# Patient Record
Sex: MALE | Race: WHITE | NOT HISPANIC OR LATINO | URBAN - METROPOLITAN AREA
[De-identification: names, ages, dates, MRNs, and addresses within clinical notes are randomized per-mention and may not be internally consistent; named-entity substitution may affect disease eponyms.]

---

## 2017-06-12 ENCOUNTER — OUTPATIENT (OUTPATIENT)
Dept: OUTPATIENT SERVICES | Facility: HOSPITAL | Age: 62
LOS: 1 days | Discharge: HOME | End: 2017-06-12

## 2017-06-28 DIAGNOSIS — N50.9 DISORDER OF MALE GENITAL ORGANS, UNSPECIFIED: ICD-10-CM

## 2019-08-06 ENCOUNTER — LABORATORY RESULT (OUTPATIENT)
Age: 64
End: 2019-08-06

## 2019-08-06 ENCOUNTER — APPOINTMENT (OUTPATIENT)
Dept: HEMATOLOGY ONCOLOGY | Facility: CLINIC | Age: 64
End: 2019-08-06
Payer: COMMERCIAL

## 2019-08-06 ENCOUNTER — FORM ENCOUNTER (OUTPATIENT)
Age: 64
End: 2019-08-06

## 2019-08-06 VITALS
BODY MASS INDEX: 22.73 KG/M2 | TEMPERATURE: 97 F | HEART RATE: 80 BPM | HEIGHT: 68 IN | DIASTOLIC BLOOD PRESSURE: 70 MMHG | WEIGHT: 150 LBS | SYSTOLIC BLOOD PRESSURE: 145 MMHG | RESPIRATION RATE: 14 BRPM

## 2019-08-06 DIAGNOSIS — Z78.9 OTHER SPECIFIED HEALTH STATUS: ICD-10-CM

## 2019-08-06 DIAGNOSIS — D72.829 ELEVATED WHITE BLOOD CELL COUNT, UNSPECIFIED: ICD-10-CM

## 2019-08-06 LAB
HCT VFR BLD CALC: 38.1 %
HGB BLD-MCNC: 12.7 G/DL
MCHC RBC-ENTMCNC: 30.4 PG
MCHC RBC-ENTMCNC: 33.3 G/DL
MCV RBC AUTO: 91.1 FL
PLATELET # BLD AUTO: 293 K/UL
PMV BLD: 9.8 FL
RBC # BLD: 4.18 M/UL
RBC # FLD: 16.4 %
WBC # FLD AUTO: 157.62 K/UL

## 2019-08-06 PROCEDURE — 99244 OFF/OP CNSLTJ NEW/EST MOD 40: CPT

## 2019-08-07 ENCOUNTER — OUTPATIENT (OUTPATIENT)
Dept: OUTPATIENT SERVICES | Facility: HOSPITAL | Age: 64
LOS: 1 days | Discharge: HOME | End: 2019-08-07
Payer: COMMERCIAL

## 2019-08-07 DIAGNOSIS — D72.829 ELEVATED WHITE BLOOD CELL COUNT, UNSPECIFIED: ICD-10-CM

## 2019-08-07 PROCEDURE — 76700 US EXAM ABDOM COMPLETE: CPT | Mod: 26

## 2019-08-08 NOTE — ASSESSMENT
[FreeTextEntry1] : 64 with no PMH was referred to us by his PMD for leukocytosis . CBC done in office showed WBC count of 157.62 , Hgb of 12.7 , platelet count 293 , with differential showing 60% neutrophils , lymphocytes of 4.6% and basophils 2.8% . \par \par # LEUKOCYTOSIS WITH white blood cell differential SHOWING  virtually all cells of the neutrophilic series SUGGESTIVE OF MYELOPROLIFERATIVE DISORDER LIKELY  CHRONIC PHASE CML :\par - peripheral smear reviewed , no blasts ,  basophilia and granulocytosis with neutrophils and immature granulocytes.\par \par - will check peripheral blood flow cytometry , fish , cytogenetics, BCR - ABL fusion gene .\par - will recommend getting us abd to evaluate for splenomegaly\par - he will need a bone marrow biopsy even if CML is confirmed  , he was told that we will call him with all the test results in few days , if it proves to CML will start treatment with gleevec since we suspect chronic phase CML most likely low risk and will calculate risk once we have flow back,  explained will consider second generation TKI if he is high risk,  side effect profile was briefly discussed with him, mainly  gleevac,  and he agreed to treatment  . Will hold off on to hydrea as his counts are acceptable and he has no symptoms or blasts in smear.\par - He was also told that if he wants to go [facility] for second opinion , he can get all the test results and go for second opinion. His daughter works there.\par - But if  he wishes to stay with us we will work according to above mentioned plan.\par - f/u 2-3 weeks once we start  treatment, Will call him with results and  will speak about side effects in detail when I see him for the bone marrow.\par -if BCR/ABL negative will send off Jak2, CALR, MPL from bone marrow specimen to r/o Myeloproliferative disorder \par -explained that if he has CML prognosis is excellent,  in most patient , normal life expectancy   \par \par Thank You

## 2019-08-08 NOTE — HISTORY OF PRESENT ILLNESS
[de-identified] : CC: My WBC is high\par \par He is here at the request of Dr. Oumou Washington MD  \par \par A 64 with no PMH was referred to us by his PMD for leukocytosis . Pt reports that he was in his usual state of health up until a 3 wks ago when he started having back pain with hemtauria , that prompted him to go to his PMD , by that time he was asymptomatic .He was referred for some routine w/u , CBC done on 7/31  showed Hb of 12.4 , with WBC count of 141.9 , diff of 70% neutrophils with rare blast cells, lymphocytes of 4% , platelets were 267 , rest of the blood work was fine . Pt was then sent for repeat CBC on 8/2, which again showed WBC count of 135 with Hb of 12 and normal platelet count , differential again demonstrated 45% neutrophils , 8% lymphocytes and 1 blast . Pt was then referred to us for further evaluation . Pt reports that he has no new symptoms, is very active , denies weight loss, loss of appetite , any recent infections, or any other symptoms .

## 2019-08-08 NOTE — CONSULT LETTER
[Dear  ___] : Dear  [unfilled], [Consult Letter:] : I had the pleasure of evaluating your patient, [unfilled]. [Please see my note below.] : Please see my note below. [Consult Closing:] : Thank you very much for allowing me to participate in the care of this patient.  If you have any questions, please do not hesitate to contact me. [Sincerely,] : Sincerely, [FreeTextEntry3] : Carlos

## 2019-08-08 NOTE — PHYSICAL EXAM
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

## 2019-08-09 LAB — T(9;22)(ABL1,BCR)/CONTROL BLD/T: NORMAL

## 2019-08-13 ENCOUNTER — LABORATORY RESULT (OUTPATIENT)
Age: 64
End: 2019-08-13

## 2019-08-13 ENCOUNTER — APPOINTMENT (OUTPATIENT)
Dept: HEMATOLOGY ONCOLOGY | Facility: CLINIC | Age: 64
End: 2019-08-13
Payer: COMMERCIAL

## 2019-08-13 ENCOUNTER — RESULT REVIEW (OUTPATIENT)
Age: 64
End: 2019-08-13

## 2019-08-13 VITALS — HEART RATE: 78 BPM | RESPIRATION RATE: 16 BRPM | TEMPERATURE: 98.4 F

## 2019-08-13 VITALS
BODY MASS INDEX: 22.43 KG/M2 | HEIGHT: 68 IN | WEIGHT: 148 LBS | SYSTOLIC BLOOD PRESSURE: 140 MMHG | DIASTOLIC BLOOD PRESSURE: 74 MMHG

## 2019-08-13 PROCEDURE — 88313 SPECIAL STAINS GROUP 2: CPT | Mod: 26

## 2019-08-13 PROCEDURE — 85097 BONE MARROW INTERPRETATION: CPT

## 2019-08-13 PROCEDURE — 88341 IMHCHEM/IMCYTCHM EA ADD ANTB: CPT | Mod: 26

## 2019-08-13 PROCEDURE — 88305 TISSUE EXAM BY PATHOLOGIST: CPT | Mod: 26

## 2019-08-13 PROCEDURE — 88311 DECALCIFY TISSUE: CPT | Mod: 26

## 2019-08-13 PROCEDURE — 38222 DX BONE MARROW BX & ASPIR: CPT

## 2019-08-13 PROCEDURE — 88342 IMHCHEM/IMCYTCHM 1ST ANTB: CPT | Mod: 26,59

## 2019-08-19 ENCOUNTER — LABORATORY RESULT (OUTPATIENT)
Age: 64
End: 2019-08-19

## 2019-08-19 ENCOUNTER — APPOINTMENT (OUTPATIENT)
Dept: HEMATOLOGY ONCOLOGY | Facility: CLINIC | Age: 64
End: 2019-08-19
Payer: COMMERCIAL

## 2019-08-19 VITALS
HEART RATE: 65 BPM | TEMPERATURE: 98.3 F | HEIGHT: 68 IN | RESPIRATION RATE: 16 BRPM | BODY MASS INDEX: 22.58 KG/M2 | WEIGHT: 149 LBS | SYSTOLIC BLOOD PRESSURE: 132 MMHG | DIASTOLIC BLOOD PRESSURE: 71 MMHG

## 2019-08-19 LAB
HCT VFR BLD CALC: 37.5 %
HGB BLD-MCNC: 12.3 G/DL
MCHC RBC-ENTMCNC: 30.5 PG
MCHC RBC-ENTMCNC: 32.8 G/DL
MCV RBC AUTO: 93.1 FL
PLATELET # BLD AUTO: 213 K/UL
PMV BLD: 10.3 FL
RBC # BLD: 4.03 M/UL
RBC # FLD: 16.6 %
WBC # FLD AUTO: 98.31 K/UL

## 2019-08-19 PROCEDURE — 99213 OFFICE O/P EST LOW 20 MIN: CPT

## 2019-08-19 PROCEDURE — 93010 ELECTROCARDIOGRAM REPORT: CPT

## 2019-08-19 NOTE — REASON FOR VISIT
[Follow-Up Visit] : a follow-up visit for [Initial Consultation] : an initial consultation [Spouse] : spouse [FreeTextEntry2] : Leukocytosis

## 2019-08-19 NOTE — ASSESSMENT
[FreeTextEntry1] : 64 with no PMH was referred to us by his PMD for leukocytosis  Found to have  CML with p210 and small p190 clone\par \par Plan:\par -on Sprycel 100 mg daiy and hydrea 500 mg BID for now, WBC coming down \par -will follow up  bone marrow results\par -RTC in 2 weeks with CBC\par -will check [facility] for QTC\par

## 2019-08-19 NOTE — PHYSICAL EXAM
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

## 2019-08-19 NOTE — HISTORY OF PRESENT ILLNESS
[FreeTextEntry1] :  sprycel 100 mg daily started on 8/14/19 [de-identified] : 8/19/19\par he is here for follow up. His BCR ABL was positive for p210 and a small clone of p190 and he was stared on Sprycel 100 mg daily. he was aslo stared on hydrea  500 mg BID. HE is doing well and his WBC are coming down. he had a bone marrow results are pending. [facility] and they agreed with current treatment.  [de-identified] : CC: My WBC is high\par \par He is here at the request of Dr. Oumou Washington MD  \par \par A 64 with no PMH was referred to us by his PMD for leukocytosis . Pt reports that he was in his usual state of health up until a 3 wks ago when he started having back pain with hemtauria , that prompted him to go to his PMD , by that time he was asymptomatic .He was referred for some routine w/u , CBC done on 7/31  showed Hb of 12.4 , with WBC count of 141.9 , diff of 70% neutrophils with rare blast cells, lymphocytes of 4% , platelets were 267 , rest of the blood work was fine . Pt was then sent for repeat CBC on 8/2, which again showed WBC count of 135 with Hb of 12 and normal platelet count , differential again demonstrated 45% neutrophils , 8% lymphocytes and 1 blast . Pt was then referred to us for further evaluation . Pt reports that he has no new symptoms, is very active , denies weight loss, loss of appetite , any recent infections, or any other symptoms .

## 2019-08-20 LAB — HEMATOPATHOLOGY REPORT: SIGNIFICANT CHANGE UP

## 2019-09-04 ENCOUNTER — LABORATORY RESULT (OUTPATIENT)
Age: 64
End: 2019-09-04

## 2019-09-04 ENCOUNTER — APPOINTMENT (OUTPATIENT)
Dept: HEMATOLOGY ONCOLOGY | Facility: CLINIC | Age: 64
End: 2019-09-04
Payer: COMMERCIAL

## 2019-09-04 VITALS
BODY MASS INDEX: 22.73 KG/M2 | DIASTOLIC BLOOD PRESSURE: 69 MMHG | HEART RATE: 72 BPM | SYSTOLIC BLOOD PRESSURE: 134 MMHG | WEIGHT: 150 LBS | HEIGHT: 68 IN | RESPIRATION RATE: 16 BRPM | TEMPERATURE: 97.1 F

## 2019-09-04 DIAGNOSIS — Z00.00 ENCOUNTER FOR GENERAL ADULT MEDICAL EXAMINATION W/OUT ABNORMAL FINDINGS: ICD-10-CM

## 2019-09-04 LAB
ALBUMIN SERPL ELPH-MCNC: 4.4 G/DL
ALP BLD-CCNC: 75 U/L
ALT SERPL-CCNC: 25 U/L
ANION GAP SERPL CALC-SCNC: 11 MMOL/L
AST SERPL-CCNC: 17 U/L
BILIRUB SERPL-MCNC: 0.5 MG/DL
BUN SERPL-MCNC: 16 MG/DL
CALCIUM SERPL-MCNC: 8.5 MG/DL
CHLORIDE SERPL-SCNC: 107 MMOL/L
CO2 SERPL-SCNC: 24 MMOL/L
CREAT SERPL-MCNC: 0.6 MG/DL
GLUCOSE SERPL-MCNC: 104 MG/DL
HCT VFR BLD CALC: 30 %
HGB BLD-MCNC: 10 G/DL
MCHC RBC-ENTMCNC: 31.1 PG
MCHC RBC-ENTMCNC: 33.3 G/DL
MCV RBC AUTO: 93.2 FL
PLATELET # BLD AUTO: 76 K/UL
PMV BLD: 9.6 FL
POTASSIUM SERPL-SCNC: 4.1 MMOL/L
PROT SERPL-MCNC: 6.7 G/DL
RBC # BLD: 3.22 M/UL
RBC # FLD: 18.8 %
SODIUM SERPL-SCNC: 142 MMOL/L
WBC # FLD AUTO: 4.98 K/UL

## 2019-09-04 PROCEDURE — 99213 OFFICE O/P EST LOW 20 MIN: CPT

## 2019-09-04 NOTE — PHYSICAL EXAM
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

## 2019-09-04 NOTE — ASSESSMENT
[FreeTextEntry1] : 64 with no PMH was referred to us by his PMD for leukocytosis  Found to have chronic phase   CML with p210 and small p190 clone\par \par Plan:\par - c/w Sprycel 100 mg daily\par -will stop hydrea now since normal WBC, his Hgb went down to to 10 and platelets are now 76,000\par -CMP sent will call with results if needed \par -will check CBC in 2 weeks and RTC in one months\par -  BCR-ABL at 3 month intervals for about 1 year and then it may be extended, not due yet\par \par

## 2019-09-04 NOTE — HISTORY OF PRESENT ILLNESS
[Therapy: ___] : Therapy: [unfilled] [de-identified] : 8/19/19\par he is here for follow up. His BCR ABL was positive for p210 and a small clone of p190 and he was stared on Sprycel 100 mg daily. he was aslo stared on hydrea  500 mg BID. HE is doing well and his WBC are coming down. he had a bone marrow results are pending. he was seen in [facility] and they agreed with current treatment. \par \par 9/4/19\par He is here for follow-up of CML.  He has been tolerating Sprycel daily with Hydrea 500mg BiD.  He offers no new complaints.  His appetite has been improving.    His CBC showed a normal WBC, his Hgb went down to to 10 and platelets are now 76,000. His bone marrow biopsy confimred chronic phase CML with repeat BCR/ABL PCR showed p210 and P190 [FreeTextEntry1] : Sprycel 100 mg daily started on 8/14/19 [de-identified] : CC: My WBC is high\par \par He is here at the request of Dr. Oumou Washington MD  \par \par A 64 with no PMH was referred to us by his PMD for leukocytosis . Pt reports that he was in his usual state of health up until a 3 wks ago when he started having back pain with hemtauria , that prompted him to go to his PMD , by that time he was asymptomatic .He was referred for some routine w/u , CBC done on 7/31  showed Hb of 12.4 , with WBC count of 141.9 , diff of 70% neutrophils with rare blast cells, lymphocytes of 4% , platelets were 267 , rest of the blood work was fine . Pt was then sent for repeat CBC on 8/2, which again showed WBC count of 135 with Hb of 12 and normal platelet count , differential again demonstrated 45% neutrophils , 8% lymphocytes and 1 blast . Pt was then referred to us for further evaluation . Pt reports that he has no new symptoms, is very active , denies weight loss, loss of appetite , any recent infections, or any other symptoms .

## 2019-09-13 ENCOUNTER — RX RENEWAL (OUTPATIENT)
Age: 64
End: 2019-09-13

## 2019-09-17 ENCOUNTER — APPOINTMENT (OUTPATIENT)
Dept: HEMATOLOGY ONCOLOGY | Facility: CLINIC | Age: 64
End: 2019-09-17

## 2019-09-17 ENCOUNTER — LABORATORY RESULT (OUTPATIENT)
Age: 64
End: 2019-09-17

## 2019-09-17 LAB
HCT VFR BLD CALC: 32.4 %
HGB BLD-MCNC: 10.7 G/DL
MCHC RBC-ENTMCNC: 32.3 PG
MCHC RBC-ENTMCNC: 33 G/DL
MCV RBC AUTO: 97.9 FL
PLATELET # BLD AUTO: 148 K/UL
PMV BLD: 9.8 FL
RBC # BLD: 3.31 M/UL
RBC # FLD: 19.9 %
WBC # FLD AUTO: 4.98 K/UL

## 2019-10-21 ENCOUNTER — APPOINTMENT (OUTPATIENT)
Dept: HEMATOLOGY ONCOLOGY | Facility: CLINIC | Age: 64
End: 2019-10-21
Payer: COMMERCIAL

## 2019-10-21 ENCOUNTER — LABORATORY RESULT (OUTPATIENT)
Age: 64
End: 2019-10-21

## 2019-10-21 VITALS
BODY MASS INDEX: 23.49 KG/M2 | TEMPERATURE: 97.6 F | HEIGHT: 68 IN | SYSTOLIC BLOOD PRESSURE: 150 MMHG | DIASTOLIC BLOOD PRESSURE: 91 MMHG | RESPIRATION RATE: 16 BRPM | WEIGHT: 155 LBS | HEART RATE: 67 BPM

## 2019-10-21 PROCEDURE — 99213 OFFICE O/P EST LOW 20 MIN: CPT

## 2019-10-21 RX ORDER — HYDROXYUREA 500 MG/1
500 CAPSULE ORAL
Qty: 60 | Refills: 0 | Status: COMPLETED | COMMUNITY
Start: 2019-08-13 | End: 2019-10-21

## 2019-10-22 LAB
ALBUMIN SERPL ELPH-MCNC: 4.4 G/DL
ALP BLD-CCNC: 96 U/L
ALT SERPL-CCNC: 16 U/L
ANION GAP SERPL CALC-SCNC: 14 MMOL/L
AST SERPL-CCNC: 15 U/L
BILIRUB SERPL-MCNC: 0.5 MG/DL
BUN SERPL-MCNC: 19 MG/DL
CALCIUM SERPL-MCNC: 9.3 MG/DL
CHLORIDE SERPL-SCNC: 104 MMOL/L
CO2 SERPL-SCNC: 23 MMOL/L
CREAT SERPL-MCNC: 0.7 MG/DL
GLUCOSE SERPL-MCNC: 114 MG/DL
HCT VFR BLD CALC: 39.8 %
HGB BLD-MCNC: 13.2 G/DL
MCHC RBC-ENTMCNC: 32 PG
MCHC RBC-ENTMCNC: 33.2 G/DL
MCV RBC AUTO: 96.6 FL
PLATELET # BLD AUTO: 153 K/UL
PMV BLD: 9.9 FL
POTASSIUM SERPL-SCNC: 4 MMOL/L
PROT SERPL-MCNC: 7.1 G/DL
RBC # BLD: 4.12 M/UL
RBC # FLD: 14.4 %
SODIUM SERPL-SCNC: 141 MMOL/L
WBC # FLD AUTO: 4.51 K/UL

## 2019-10-24 NOTE — HISTORY OF PRESENT ILLNESS
[Therapy: ___] : Therapy: [unfilled] [de-identified] : 8/19/19\par he is here for follow up. His BCR ABL was positive for p210 and a small clone of p190 and he was stared on Sprycel 100 mg daily. he was aslo stared on hydrea  500 mg BID. HE is doing well and his WBC are coming down. he had a bone marrow results are pending. he was seen in [facility] and they agreed with current treatment. \par \par 9/4/19\par He is here for follow-up of CML.  He has been tolerating Sprycel daily with Hydrea 500mg BiD.  He offers no new complaints.  His appetite has been improving.    His CBC showed a normal WBC, his Hgb went down to to 10 and platelets are now 76,000. His bone marrow biopsy confimred chronic phase CML with repeat BCR/ABL PCR showed p210 and P190\par \par 10/21/19\par He is here for follow up. Reports no Side effects. His CBC is showing mild anemia and mild decrease WBC. He has no complaints. Not SOB.  No edema.  [FreeTextEntry1] : Sprycel 100 mg daily started on 8/14/19 [de-identified] : CC: My WBC is high\par \par He is here at the request of Dr. Oumou Washington MD  \par \par A 64 with no PMH was referred to us by his PMD for leukocytosis . Pt reports that he was in his usual state of health up until a 3 wks ago when he started having back pain with hemtauria , that prompted him to go to his PMD , by that time he was asymptomatic .He was referred for some routine w/u , CBC done on 7/31  showed Hb of 12.4 , with WBC count of 141.9 , diff of 70% neutrophils with rare blast cells, lymphocytes of 4% , platelets were 267 , rest of the blood work was fine . Pt was then sent for repeat CBC on 8/2, which again showed WBC count of 135 with Hb of 12 and normal platelet count , differential again demonstrated 45% neutrophils , 8% lymphocytes and 1 blast . Pt was then referred to us for further evaluation . Pt reports that he has no new symptoms, is very active , denies weight loss, loss of appetite , any recent infections, or any other symptoms .

## 2019-10-24 NOTE — ASSESSMENT
[FreeTextEntry1] : 64 with no PMH was referred to us by his PMD for leukocytosis  Found to have chronic phase   CML with p210 and small p190 clone\par \par Plan:\par - c/w Sprycel 100 mg daily\par -will check BCR/ABL and CBC next month\par -  BCR-ABL at 3 month intervals for about 1 year and then it may be extended,\par -will see me in 3 months of BCR/ABL decreasing as expected and has no side effects\par \par \par \par

## 2019-11-14 ENCOUNTER — LABORATORY RESULT (OUTPATIENT)
Age: 64
End: 2019-11-14

## 2019-11-14 ENCOUNTER — APPOINTMENT (OUTPATIENT)
Dept: HEMATOLOGY ONCOLOGY | Facility: CLINIC | Age: 64
End: 2019-11-14

## 2019-11-14 ENCOUNTER — OUTPATIENT (OUTPATIENT)
Dept: OUTPATIENT SERVICES | Facility: HOSPITAL | Age: 64
LOS: 1 days | Discharge: HOME | End: 2019-11-14
Payer: COMMERCIAL

## 2019-11-14 DIAGNOSIS — D72.829 ELEVATED WHITE BLOOD CELL COUNT, UNSPECIFIED: ICD-10-CM

## 2019-11-14 DIAGNOSIS — C92.10 CHRONIC MYELOID LEUKEMIA, BCR/ABL-POSITIVE, NOT HAVING ACHIEVED REMISSION: ICD-10-CM

## 2019-11-14 LAB
HCT VFR BLD CALC: 42.1 %
HGB BLD-MCNC: 14.2 G/DL
MCHC RBC-ENTMCNC: 31.7 PG
MCHC RBC-ENTMCNC: 33.7 G/DL
MCV RBC AUTO: 94 FL
PLATELET # BLD AUTO: 179 K/UL
PMV BLD: 9.4 FL
RBC # BLD: 4.48 M/UL
RBC # FLD: 13 %
WBC # FLD AUTO: 6.98 K/UL

## 2019-11-16 LAB
ALBUMIN SERPL ELPH-MCNC: 4.7 G/DL
ALP BLD-CCNC: 94 U/L
ALT SERPL-CCNC: 16 U/L
ANION GAP SERPL CALC-SCNC: 14 MMOL/L
AST SERPL-CCNC: 19 U/L
BILIRUB SERPL-MCNC: 0.3 MG/DL
BUN SERPL-MCNC: 20 MG/DL
CALCIUM SERPL-MCNC: 9 MG/DL
CHLORIDE SERPL-SCNC: 103 MMOL/L
CO2 SERPL-SCNC: 25 MMOL/L
CREAT SERPL-MCNC: 0.8 MG/DL
GLUCOSE SERPL-MCNC: 86 MG/DL
POTASSIUM SERPL-SCNC: 3.9 MMOL/L
PROT SERPL-MCNC: 7.7 G/DL
SODIUM SERPL-SCNC: 142 MMOL/L

## 2019-11-21 LAB — T(9;22)(ABL1,BCR)/CONTROL BLD/T: NORMAL

## 2019-12-24 ENCOUNTER — OUTPATIENT (OUTPATIENT)
Dept: OUTPATIENT SERVICES | Facility: HOSPITAL | Age: 64
LOS: 1 days | Discharge: HOME | End: 2019-12-24
Payer: COMMERCIAL

## 2019-12-24 VITALS
DIASTOLIC BLOOD PRESSURE: 74 MMHG | WEIGHT: 149.03 LBS | HEART RATE: 68 BPM | OXYGEN SATURATION: 99 % | SYSTOLIC BLOOD PRESSURE: 132 MMHG | TEMPERATURE: 98 F | HEIGHT: 68 IN | RESPIRATION RATE: 16 BRPM

## 2019-12-24 DIAGNOSIS — Z98.890 OTHER SPECIFIED POSTPROCEDURAL STATES: Chronic | ICD-10-CM

## 2019-12-24 DIAGNOSIS — N20.0 CALCULUS OF KIDNEY: ICD-10-CM

## 2019-12-24 DIAGNOSIS — Z01.818 ENCOUNTER FOR OTHER PREPROCEDURAL EXAMINATION: ICD-10-CM

## 2019-12-24 LAB
ALBUMIN SERPL ELPH-MCNC: 4.2 G/DL — SIGNIFICANT CHANGE UP (ref 3.5–5.2)
ALP SERPL-CCNC: 87 U/L — SIGNIFICANT CHANGE UP (ref 30–115)
ALT FLD-CCNC: 13 U/L — SIGNIFICANT CHANGE UP (ref 0–41)
ANION GAP SERPL CALC-SCNC: 15 MMOL/L — HIGH (ref 7–14)
APPEARANCE UR: CLEAR — SIGNIFICANT CHANGE UP
APTT BLD: 33.5 SEC — SIGNIFICANT CHANGE UP (ref 27–39.2)
AST SERPL-CCNC: 16 U/L — SIGNIFICANT CHANGE UP (ref 0–41)
BACTERIA # UR AUTO: NEGATIVE — SIGNIFICANT CHANGE UP
BASOPHILS # BLD AUTO: 0.04 K/UL — SIGNIFICANT CHANGE UP (ref 0–0.2)
BASOPHILS NFR BLD AUTO: 0.6 % — SIGNIFICANT CHANGE UP (ref 0–1)
BILIRUB SERPL-MCNC: 0.5 MG/DL — SIGNIFICANT CHANGE UP (ref 0.2–1.2)
BILIRUB UR-MCNC: NEGATIVE — SIGNIFICANT CHANGE UP
BUN SERPL-MCNC: 20 MG/DL — SIGNIFICANT CHANGE UP (ref 10–20)
CALCIUM SERPL-MCNC: 9 MG/DL — SIGNIFICANT CHANGE UP (ref 8.5–10.1)
CHLORIDE SERPL-SCNC: 100 MMOL/L — SIGNIFICANT CHANGE UP (ref 98–110)
CO2 SERPL-SCNC: 25 MMOL/L — SIGNIFICANT CHANGE UP (ref 17–32)
COLOR SPEC: YELLOW — SIGNIFICANT CHANGE UP
CREAT SERPL-MCNC: 0.9 MG/DL — SIGNIFICANT CHANGE UP (ref 0.7–1.5)
DIFF PNL FLD: NEGATIVE — SIGNIFICANT CHANGE UP
EOSINOPHIL # BLD AUTO: 0.12 K/UL — SIGNIFICANT CHANGE UP (ref 0–0.7)
EOSINOPHIL NFR BLD AUTO: 1.7 % — SIGNIFICANT CHANGE UP (ref 0–8)
EPI CELLS # UR: 1 /HPF — SIGNIFICANT CHANGE UP (ref 0–5)
GLUCOSE SERPL-MCNC: 103 MG/DL — HIGH (ref 70–99)
GLUCOSE UR QL: NEGATIVE — SIGNIFICANT CHANGE UP
HCT VFR BLD CALC: 41.8 % — LOW (ref 42–52)
HGB BLD-MCNC: 14 G/DL — SIGNIFICANT CHANGE UP (ref 14–18)
HYALINE CASTS # UR AUTO: 2 /LPF — SIGNIFICANT CHANGE UP (ref 0–7)
IMM GRANULOCYTES NFR BLD AUTO: 0.1 % — SIGNIFICANT CHANGE UP (ref 0.1–0.3)
INR BLD: 1.07 RATIO — SIGNIFICANT CHANGE UP (ref 0.65–1.3)
KETONES UR-MCNC: NEGATIVE — SIGNIFICANT CHANGE UP
LEUKOCYTE ESTERASE UR-ACNC: ABNORMAL
LYMPHOCYTES # BLD AUTO: 3.28 K/UL — SIGNIFICANT CHANGE UP (ref 1.2–3.4)
LYMPHOCYTES # BLD AUTO: 46.6 % — SIGNIFICANT CHANGE UP (ref 20.5–51.1)
MCHC RBC-ENTMCNC: 30.9 PG — SIGNIFICANT CHANGE UP (ref 27–31)
MCHC RBC-ENTMCNC: 33.5 G/DL — SIGNIFICANT CHANGE UP (ref 32–37)
MCV RBC AUTO: 92.3 FL — SIGNIFICANT CHANGE UP (ref 80–94)
MONOCYTES # BLD AUTO: 0.68 K/UL — HIGH (ref 0.1–0.6)
MONOCYTES NFR BLD AUTO: 9.7 % — HIGH (ref 1.7–9.3)
NEUTROPHILS # BLD AUTO: 2.91 K/UL — SIGNIFICANT CHANGE UP (ref 1.4–6.5)
NEUTROPHILS NFR BLD AUTO: 41.3 % — LOW (ref 42.2–75.2)
NITRITE UR-MCNC: POSITIVE
NRBC # BLD: 0 /100 WBCS — SIGNIFICANT CHANGE UP (ref 0–0)
PH UR: 6 — SIGNIFICANT CHANGE UP (ref 5–8)
PLATELET # BLD AUTO: 175 K/UL — SIGNIFICANT CHANGE UP (ref 130–400)
POTASSIUM SERPL-MCNC: 3.8 MMOL/L — SIGNIFICANT CHANGE UP (ref 3.5–5)
POTASSIUM SERPL-SCNC: 3.8 MMOL/L — SIGNIFICANT CHANGE UP (ref 3.5–5)
PROT SERPL-MCNC: 7.5 G/DL — SIGNIFICANT CHANGE UP (ref 6–8)
PROT UR-MCNC: SIGNIFICANT CHANGE UP
PROTHROM AB SERPL-ACNC: 12.3 SEC — SIGNIFICANT CHANGE UP (ref 9.95–12.87)
RBC # BLD: 4.53 M/UL — LOW (ref 4.7–6.1)
RBC # FLD: 13.4 % — SIGNIFICANT CHANGE UP (ref 11.5–14.5)
RBC CASTS # UR COMP ASSIST: 1 /HPF — SIGNIFICANT CHANGE UP (ref 0–4)
SODIUM SERPL-SCNC: 140 MMOL/L — SIGNIFICANT CHANGE UP (ref 135–146)
SP GR SPEC: 1.03 — HIGH (ref 1.01–1.02)
UROBILINOGEN FLD QL: SIGNIFICANT CHANGE UP
WBC # BLD: 7.04 K/UL — SIGNIFICANT CHANGE UP (ref 4.8–10.8)
WBC # FLD AUTO: 7.04 K/UL — SIGNIFICANT CHANGE UP (ref 4.8–10.8)
WBC UR QL: 22 /HPF — HIGH (ref 0–5)

## 2019-12-24 PROCEDURE — 71046 X-RAY EXAM CHEST 2 VIEWS: CPT | Mod: 26

## 2019-12-24 PROCEDURE — 93010 ELECTROCARDIOGRAM REPORT: CPT

## 2019-12-24 NOTE — H&P PST ADULT - NSANTHOSAYNRD_GEN_A_CORE
No. EGE screening performed.  STOP BANG Legend: 0-2 = LOW Risk; 3-4 = INTERMEDIATE Risk; 5-8 = HIGH Risk

## 2019-12-24 NOTE — H&P PST ADULT - REASON FOR ADMISSION
64 yr old man to past for cystoscopy laser lithotripsy of bladder stones no fever no dysuria no uri cp palp sob pain at this time exercise tolerance is 2 lfights no rahul screen revd

## 2019-12-26 LAB
-  AMIKACIN: SIGNIFICANT CHANGE UP
-  AZTREONAM: SIGNIFICANT CHANGE UP
-  CEFEPIME: SIGNIFICANT CHANGE UP
-  CEFTAZIDIME: SIGNIFICANT CHANGE UP
-  CIPROFLOXACIN: SIGNIFICANT CHANGE UP
-  GENTAMICIN: SIGNIFICANT CHANGE UP
-  IMIPENEM: SIGNIFICANT CHANGE UP
-  LEVOFLOXACIN: SIGNIFICANT CHANGE UP
-  MEROPENEM: SIGNIFICANT CHANGE UP
-  PIPERACILLIN/TAZOBACTAM: SIGNIFICANT CHANGE UP
-  TOBRAMYCIN: SIGNIFICANT CHANGE UP
CULTURE RESULTS: SIGNIFICANT CHANGE UP
METHOD TYPE: SIGNIFICANT CHANGE UP
ORGANISM # SPEC MICROSCOPIC CNT: SIGNIFICANT CHANGE UP
ORGANISM # SPEC MICROSCOPIC CNT: SIGNIFICANT CHANGE UP
SPECIMEN SOURCE: SIGNIFICANT CHANGE UP

## 2020-01-08 ENCOUNTER — OUTPATIENT (OUTPATIENT)
Dept: OUTPATIENT SERVICES | Facility: HOSPITAL | Age: 65
LOS: 1 days | Discharge: HOME | End: 2020-01-08

## 2020-01-08 VITALS
SYSTOLIC BLOOD PRESSURE: 156 MMHG | DIASTOLIC BLOOD PRESSURE: 78 MMHG | RESPIRATION RATE: 17 BRPM | TEMPERATURE: 98 F | HEART RATE: 85 BPM | WEIGHT: 149.03 LBS | OXYGEN SATURATION: 100 % | HEIGHT: 68 IN

## 2020-01-08 VITALS — DIASTOLIC BLOOD PRESSURE: 70 MMHG | HEART RATE: 67 BPM | SYSTOLIC BLOOD PRESSURE: 156 MMHG | RESPIRATION RATE: 18 BRPM

## 2020-01-08 DIAGNOSIS — Z98.890 OTHER SPECIFIED POSTPROCEDURAL STATES: Chronic | ICD-10-CM

## 2020-01-08 RX ORDER — PHENAZOPYRIDINE HCL 100 MG
200 TABLET ORAL ONCE
Refills: 0 | Status: COMPLETED | OUTPATIENT
Start: 2020-01-08 | End: 2020-01-08

## 2020-01-08 RX ORDER — ACETAMINOPHEN 500 MG
650 TABLET ORAL ONCE
Refills: 0 | Status: DISCONTINUED | OUTPATIENT
Start: 2020-01-08 | End: 2020-02-04

## 2020-01-08 RX ORDER — ONDANSETRON 8 MG/1
4 TABLET, FILM COATED ORAL ONCE
Refills: 0 | Status: DISCONTINUED | OUTPATIENT
Start: 2020-01-08 | End: 2020-02-04

## 2020-01-08 RX ORDER — OXYCODONE AND ACETAMINOPHEN 5; 325 MG/1; MG/1
1 TABLET ORAL EVERY 4 HOURS
Refills: 0 | Status: DISCONTINUED | OUTPATIENT
Start: 2020-01-08 | End: 2020-01-08

## 2020-01-08 RX ORDER — MORPHINE SULFATE 50 MG/1
2 CAPSULE, EXTENDED RELEASE ORAL
Refills: 0 | Status: DISCONTINUED | OUTPATIENT
Start: 2020-01-08 | End: 2020-01-08

## 2020-01-08 RX ORDER — DASATINIB 80 MG/1
1 TABLET ORAL
Qty: 0 | Refills: 0 | DISCHARGE

## 2020-01-08 RX ORDER — SODIUM CHLORIDE 9 MG/ML
1000 INJECTION, SOLUTION INTRAVENOUS
Refills: 0 | Status: DISCONTINUED | OUTPATIENT
Start: 2020-01-08 | End: 2020-02-04

## 2020-01-08 RX ADMIN — SODIUM CHLORIDE 100 MILLILITER(S): 9 INJECTION, SOLUTION INTRAVENOUS at 11:45

## 2020-01-08 RX ADMIN — Medication 200 MILLIGRAM(S): at 11:44

## 2020-01-08 NOTE — ASU DISCHARGE PLAN (ADULT/PEDIATRIC) - CARE PROVIDER_API CALL
Rubin Torres)  Urology  [facility] [facility], [address]  [place] [address]  Phone: [phone]  Fax: [phone]  Follow Up Time:

## 2020-01-08 NOTE — BRIEF OPERATIVE NOTE - NSICDXBRIEFPOSTOP_GEN_ALL_CORE_FT
POST-OP DIAGNOSIS:  BPH with obstruction/lower urinary tract symptoms 08-Jan-2020 10:48:07  Rubin Torres  Bladder stones 08-Jan-2020 10:47:56  Rubin Torres

## 2020-01-08 NOTE — BRIEF OPERATIVE NOTE - NSICDXBRIEFPREOP_GEN_ALL_CORE_FT
PRE-OP DIAGNOSIS:  BPH with urinary obstruction 08-Jan-2020 10:47:37  Rubin Torres  Bladder stones 08-Jan-2020 10:47:28  Rubni Torrse

## 2020-01-08 NOTE — ASU DISCHARGE PLAN (ADULT/PEDIATRIC) - CALL YOUR DOCTOR IF YOU HAVE ANY OF THE FOLLOWING:
Swelling that gets worse/Pain not relieved by Medications/Nausea and vomiting that does not stop/Bleeding that does not stop/Fever greater than (need to indicate Fahrenheit or Celsius)

## 2020-01-10 DIAGNOSIS — N21.0 CALCULUS IN BLADDER: ICD-10-CM

## 2020-01-10 DIAGNOSIS — N40.1 BENIGN PROSTATIC HYPERPLASIA WITH LOWER URINARY TRACT SYMPTOMS: ICD-10-CM

## 2020-01-10 DIAGNOSIS — N13.8 OTHER OBSTRUCTIVE AND REFLUX UROPATHY: ICD-10-CM

## 2020-01-10 LAB — NIDUS STONE QN: SIGNIFICANT CHANGE UP

## 2020-01-21 NOTE — PRE-ANESTHESIA EVALUATION ADULT - RESPIRATORY RATE (BREATHS/MIN)
17 Bactrim Counseling:  I discussed with the patient the risks of sulfa antibiotics including but not limited to GI upset, allergic reaction, drug rash, diarrhea, dizziness, photosensitivity, and yeast infections.  Rarely, more serious reactions can occur including but not limited to aplastic anemia, agranulocytosis, methemoglobinemia, blood dyscrasias, liver or kidney failure, lung infiltrates or desquamative/blistering drug rashes.

## 2020-01-22 ENCOUNTER — OUTPATIENT (OUTPATIENT)
Dept: OUTPATIENT SERVICES | Facility: HOSPITAL | Age: 65
LOS: 1 days | Discharge: HOME | End: 2020-01-22

## 2020-01-22 ENCOUNTER — APPOINTMENT (OUTPATIENT)
Dept: HEMATOLOGY ONCOLOGY | Facility: CLINIC | Age: 65
End: 2020-01-22
Payer: COMMERCIAL

## 2020-01-22 ENCOUNTER — LABORATORY RESULT (OUTPATIENT)
Age: 65
End: 2020-01-22

## 2020-01-22 VITALS
HEIGHT: 68 IN | RESPIRATION RATE: 16 BRPM | HEART RATE: 77 BPM | WEIGHT: 155 LBS | BODY MASS INDEX: 23.49 KG/M2 | DIASTOLIC BLOOD PRESSURE: 68 MMHG | TEMPERATURE: 97.5 F | SYSTOLIC BLOOD PRESSURE: 139 MMHG

## 2020-01-22 DIAGNOSIS — Z98.890 OTHER SPECIFIED POSTPROCEDURAL STATES: Chronic | ICD-10-CM

## 2020-01-22 PROBLEM — C92.10 CHRONIC MYELOID LEUKEMIA, BCR/ABL-POSITIVE, NOT HAVING ACHIEVED REMISSION: Chronic | Status: ACTIVE | Noted: 2019-12-24

## 2020-01-22 LAB
HCT VFR BLD CALC: 39.8 %
HGB BLD-MCNC: 13.2 G/DL
MCHC RBC-ENTMCNC: 29.8 PG
MCHC RBC-ENTMCNC: 33.2 G/DL
MCV RBC AUTO: 89.8 FL
PLATELET # BLD AUTO: 186 K/UL
PMV BLD: 9.3 FL
RBC # BLD: 4.43 M/UL
RBC # FLD: 14.3 %
WBC # FLD AUTO: 8.27 K/UL

## 2020-01-22 PROCEDURE — 99213 OFFICE O/P EST LOW 20 MIN: CPT

## 2020-01-22 NOTE — ASSESSMENT
[FreeTextEntry1] : 64 with no PMH was referred to us by his PMD for leukocytosis  Found to have chronic phase   CML with p210 and small p190 clone. p190 clone has resolved and BCR ABL is falling at the 3 months. \par \par Plan:\par - c/w Sprycel 100 mg daily\par -will check BCR/ABL  today with CMP as well\par -  BCR-ABL at 3 month intervals for about 1 year and then it may be extended,\par -will see me in 3 months\par \par \par

## 2020-01-22 NOTE — PHYSICAL EXAM
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

## 2020-01-22 NOTE — HISTORY OF PRESENT ILLNESS
[Therapy: ___] : Therapy: [unfilled] [FreeTextEntry1] : Sprycel 100 mg daily started on 8/14/19 [de-identified] : 8/19/19\par he is here for follow up. His BCR ABL was positive for p210 and a small clone of p190 and he was stared on Sprycel 100 mg daily. he was aslo stared on hydrea  500 mg BID. HE is doing well and his WBC are coming down. he had a bone marrow results are pending. he was seen in [facility] and they agreed with current treatment. \par \par 9/4/19\par He is here for follow-up of CML.  He has been tolerating Sprycel daily with Hydrea 500mg BiD.  He offers no new complaints.  His appetite has been improving.    His CBC showed a normal WBC, his Hgb went down to to 10 and platelets are now 76,000. His bone marrow biopsy confimred chronic phase CML with repeat BCR/ABL PCR showed p210 and P190\par \par 10/21/19\par He is here for follow up. Reports no Side effects. His CBC is showing mild anemia and mild decrease WBC. He has no complaints. Not SOB.  No edema. \par \par \par 1/22/2020\par He is here for follow up his BCR ALB was  negative now for p190 and p210 was 1.041 in 11/2019. Down from  >10%  from August. CBC from today  just has mild anemia . He feels well and has no complaitns. No new meds. [de-identified] : CC: My WBC is high\par \par He is here at the request of Dr. Oumou Washington MD  \par \par A 64 with no PMH was referred to us by his PMD for leukocytosis . Pt reports that he was in his usual state of health up until a 3 wks ago when he started having back pain with hemtauria , that prompted him to go to his PMD , by that time he was asymptomatic .He was referred for some routine w/u , CBC done on 7/31  showed Hb of 12.4 , with WBC count of 141.9 , diff of 70% neutrophils with rare blast cells, lymphocytes of 4% , platelets were 267 , rest of the blood work was fine . Pt was then sent for repeat CBC on 8/2, which again showed WBC count of 135 with Hb of 12 and normal platelet count , differential again demonstrated 45% neutrophils , 8% lymphocytes and 1 blast . Pt was then referred to us for further evaluation . Pt reports that he has no new symptoms, is very active , denies weight loss, loss of appetite , any recent infections, or any other symptoms .

## 2020-01-23 LAB
ALBUMIN SERPL ELPH-MCNC: 4.5 G/DL
ALP BLD-CCNC: 87 U/L
ALT SERPL-CCNC: 17 U/L
ANION GAP SERPL CALC-SCNC: 13 MMOL/L
AST SERPL-CCNC: 16 U/L
BILIRUB SERPL-MCNC: 0.5 MG/DL
BUN SERPL-MCNC: 23 MG/DL
CALCIUM SERPL-MCNC: 9.2 MG/DL
CHLORIDE SERPL-SCNC: 102 MMOL/L
CO2 SERPL-SCNC: 25 MMOL/L
CREAT SERPL-MCNC: 0.8 MG/DL
GLUCOSE SERPL-MCNC: 108 MG/DL
POTASSIUM SERPL-SCNC: 3.7 MMOL/L
PROT SERPL-MCNC: 7.5 G/DL
SODIUM SERPL-SCNC: 140 MMOL/L

## 2020-01-24 DIAGNOSIS — C92.10 CHRONIC MYELOID LEUKEMIA, BCR/ABL-POSITIVE, NOT HAVING ACHIEVED REMISSION: ICD-10-CM

## 2020-01-28 LAB — T(9;22)(ABL1,BCR)/CONTROL BLD/T: NORMAL

## 2020-04-09 ENCOUNTER — RX RENEWAL (OUTPATIENT)
Age: 65
End: 2020-04-09

## 2020-04-22 ENCOUNTER — APPOINTMENT (OUTPATIENT)
Dept: HEMATOLOGY ONCOLOGY | Facility: CLINIC | Age: 65
End: 2020-04-22

## 2020-04-27 ENCOUNTER — APPOINTMENT (OUTPATIENT)
Dept: HEMATOLOGY ONCOLOGY | Facility: CLINIC | Age: 65
End: 2020-04-27

## 2020-06-17 NOTE — PHYSICAL EXAM
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

## 2020-07-03 ENCOUNTER — TRANSCRIPTION ENCOUNTER (OUTPATIENT)
Age: 65
End: 2020-07-03

## 2020-08-18 ENCOUNTER — LABORATORY RESULT (OUTPATIENT)
Age: 65
End: 2020-08-18

## 2020-08-18 ENCOUNTER — APPOINTMENT (OUTPATIENT)
Dept: HEMATOLOGY ONCOLOGY | Facility: CLINIC | Age: 65
End: 2020-08-18
Payer: COMMERCIAL

## 2020-08-18 VITALS
TEMPERATURE: 97.1 F | BODY MASS INDEX: 23.64 KG/M2 | WEIGHT: 156 LBS | RESPIRATION RATE: 16 BRPM | SYSTOLIC BLOOD PRESSURE: 160 MMHG | HEIGHT: 68 IN | HEART RATE: 75 BPM | DIASTOLIC BLOOD PRESSURE: 84 MMHG

## 2020-08-18 PROCEDURE — 99213 OFFICE O/P EST LOW 20 MIN: CPT

## 2020-08-19 LAB
ALBUMIN SERPL ELPH-MCNC: 4.7 G/DL
ALP BLD-CCNC: 69 U/L
ALT SERPL-CCNC: 14 U/L
ANION GAP SERPL CALC-SCNC: 11 MMOL/L
AST SERPL-CCNC: 16 U/L
BILIRUB SERPL-MCNC: 0.4 MG/DL
BUN SERPL-MCNC: 20 MG/DL
CALCIUM SERPL-MCNC: 9.1 MG/DL
CHLORIDE SERPL-SCNC: 103 MMOL/L
CO2 SERPL-SCNC: 26 MMOL/L
CREAT SERPL-MCNC: 1.1 MG/DL
FERRITIN SERPL-MCNC: 142 NG/ML
GLUCOSE SERPL-MCNC: 109 MG/DL
HCT VFR BLD CALC: 41.9 %
HGB BLD-MCNC: 13.9 G/DL
IRON SATN MFR SERPL: 18 %
IRON SERPL-MCNC: 53 UG/DL
MCHC RBC-ENTMCNC: 30.5 PG
MCHC RBC-ENTMCNC: 33.2 G/DL
MCV RBC AUTO: 92.1 FL
PLATELET # BLD AUTO: 216 K/UL
PMV BLD: 9.8 FL
POTASSIUM SERPL-SCNC: 4.3 MMOL/L
PROT SERPL-MCNC: 7.4 G/DL
RBC # BLD: 4.55 M/UL
RBC # FLD: 13.6 %
SODIUM SERPL-SCNC: 140 MMOL/L
TIBC SERPL-MCNC: 296 UG/DL
UIBC SERPL-MCNC: 243 UG/DL
VIT B12 SERPL-MCNC: 286 PG/ML
WBC # FLD AUTO: 6.39 K/UL

## 2020-08-26 LAB — T(9;22)(ABL1,BCR)/CONTROL BLD/T: NORMAL

## 2020-09-15 NOTE — ASSESSMENT
[FreeTextEntry1] : 65 with no PMH was referred to us by his PMD for leukocytosis  Found to have chronic phase   CML with p210 and small p190 clone. p190 clone has resolved and BCR ABL remains at goal. Was undetectable in [facility] in 4/20. In our lab p190 is negative and BCR/ABP was 0.006 ( sensitivity threshold) from today Visit optimal response \par \par Plan:\par - c/w Sprycel 100 mg daily at one year josselin \par - BCR/ABL  was done as above \par -  BCR-ABL  will be done in 6 month \par -will see me in  6 months \par \par \par

## 2020-09-15 NOTE — PHYSICAL EXAM
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

## 2020-09-15 NOTE — CONSULT LETTER
[Consult Letter:] : I had the pleasure of evaluating your patient, [unfilled]. [Dear  ___] : Dear  [unfilled], [Please see my note below.] : Please see my note below. [Consult Closing:] : Thank you very much for allowing me to participate in the care of this patient.  If you have any questions, please do not hesitate to contact me. [Sincerely,] : Sincerely, [FreeTextEntry3] : Carlos

## 2020-09-15 NOTE — HISTORY OF PRESENT ILLNESS
[FreeTextEntry1] : Sprycel 100 mg daily started on 8/14/19 [Therapy: ___] : Therapy: [unfilled] [de-identified] : CC: My WBC is high\par \par He is here at the request of Dr. Oumou Washington MD  \par \par A 64 with no PMH was referred to us by his PMD for leukocytosis . Pt reports that he was in his usual state of health up until a 3 wks ago when he started having back pain with hemtauria , that prompted him to go to his PMD , by that time he was asymptomatic .He was referred for some routine w/u , CBC done on 7/31  showed Hb of 12.4 , with WBC count of 141.9 , diff of 70% neutrophils with rare blast cells, lymphocytes of 4% , platelets were 267 , rest of the blood work was fine . Pt was then sent for repeat CBC on 8/2, which again showed WBC count of 135 with Hb of 12 and normal platelet count , differential again demonstrated 45% neutrophils , 8% lymphocytes and 1 blast . Pt was then referred to us for further evaluation . Pt reports that he has no new symptoms, is very active , denies weight loss, loss of appetite , any recent infections, or any other symptoms .  [de-identified] : 8/19/19\par he is here for follow up. His BCR ABL was positive for p210 and a small clone of p190 and he was stared on Sprycel 100 mg daily. he was aslo stared on hydrea  500 mg BID. HE is doing well and his WBC are coming down. he had a bone marrow results are pending. he was seen in [facility] [place] and they agreed with current treatment. \par \par 9/4/19\par He is here for follow-up of CML.  He has been tolerating Sprycel daily with Hydrea 500mg BiD.  He offers no new complaints.  His appetite has been improving.    His CBC showed a normal WBC, his Hgb went down to to 10 and platelets are now 76,000. His bone marrow biopsy confimred chronic phase CML with repeat BCR/ABL PCR showed p210 and P190\par \par 10/21/19\par He is here for follow up. Reports no Side effects. His CBC is showing mild anemia and mild decrease WBC. He has no complaints. Not SOB.  No edema. \par \par \par 1/22/2020\par He is here for follow up his BCR ALB was  negative now for p190 and p210 was 1.041 in 11/2019. Down from  >10%  from August. CBC from today  just has mild anemia . He feels well and has no complaitns. No new meds.\par \par 8/18/20\par He was here for follow up. He moved to [place]. He has been gettig his BCR/ABL done in [facility] and in April it was Undetectable. He has no complaitns.

## 2020-10-22 ENCOUNTER — NON-APPOINTMENT (OUTPATIENT)
Age: 65
End: 2020-10-22

## 2020-10-26 ENCOUNTER — NON-APPOINTMENT (OUTPATIENT)
Age: 65
End: 2020-10-26

## 2021-02-24 ENCOUNTER — OUTPATIENT (OUTPATIENT)
Dept: OUTPATIENT SERVICES | Facility: HOSPITAL | Age: 66
LOS: 1 days | Discharge: HOME | End: 2021-02-24

## 2021-02-24 ENCOUNTER — APPOINTMENT (OUTPATIENT)
Dept: HEMATOLOGY ONCOLOGY | Facility: CLINIC | Age: 66
End: 2021-02-24
Payer: COMMERCIAL

## 2021-02-24 ENCOUNTER — LABORATORY RESULT (OUTPATIENT)
Age: 66
End: 2021-02-24

## 2021-02-24 VITALS
HEART RATE: 94 BPM | DIASTOLIC BLOOD PRESSURE: 79 MMHG | WEIGHT: 156 LBS | TEMPERATURE: 98.1 F | HEIGHT: 68 IN | SYSTOLIC BLOOD PRESSURE: 165 MMHG | BODY MASS INDEX: 23.64 KG/M2 | RESPIRATION RATE: 16 BRPM

## 2021-02-24 DIAGNOSIS — C92.10 CHRONIC MYELOID LEUKEMIA, BCR/ABL-POSITIVE, NOT HAVING ACHIEVED REMISSION: ICD-10-CM

## 2021-02-24 DIAGNOSIS — Z98.890 OTHER SPECIFIED POSTPROCEDURAL STATES: Chronic | ICD-10-CM

## 2021-02-24 LAB
HCT VFR BLD CALC: 45.7 %
HGB BLD-MCNC: 15.6 G/DL
MCHC RBC-ENTMCNC: 31.1 PG
MCHC RBC-ENTMCNC: 34.1 G/DL
MCV RBC AUTO: 91 FL
PLATELET # BLD AUTO: 265 K/UL
PMV BLD: 9.9 FL
RBC # BLD: 5.02 M/UL
RBC # FLD: 13.5 %
WBC # FLD AUTO: 7.8 K/UL

## 2021-02-24 PROCEDURE — 99213 OFFICE O/P EST LOW 20 MIN: CPT

## 2021-02-25 LAB
ALBUMIN SERPL ELPH-MCNC: 4.6 G/DL
ALP BLD-CCNC: 75 U/L
ALT SERPL-CCNC: 15 U/L
ANION GAP SERPL CALC-SCNC: 12 MMOL/L
AST SERPL-CCNC: 15 U/L
BILIRUB SERPL-MCNC: 0.3 MG/DL
BUN SERPL-MCNC: 22 MG/DL
CALCIUM SERPL-MCNC: 9.2 MG/DL
CHLORIDE SERPL-SCNC: 102 MMOL/L
CO2 SERPL-SCNC: 27 MMOL/L
CREAT SERPL-MCNC: 0.9 MG/DL
FERRITIN SERPL-MCNC: 84 NG/ML
GLUCOSE SERPL-MCNC: 126 MG/DL
IRON SATN MFR SERPL: 22 %
IRON SERPL-MCNC: 69 UG/DL
POTASSIUM SERPL-SCNC: 3.8 MMOL/L
PROT SERPL-MCNC: 7.7 G/DL
SODIUM SERPL-SCNC: 141 MMOL/L
TIBC SERPL-MCNC: 309 UG/DL
UIBC SERPL-MCNC: 240 UG/DL

## 2021-02-26 LAB — T(9;22)(ABL1,BCR)/CONTROL BLD/T: NORMAL

## 2021-03-01 NOTE — PHYSICAL EXAM
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

## 2021-03-01 NOTE — ASSESSMENT
[FreeTextEntry1] : 65 with no PMH was referred to us by his PMD for leukocytosis  Found to have chronic phase   CML with p210 and small p190 clone. p190 clone has resolved and BCR ABL has been stable. Came back 0.003 from todays visit and  was 0.006 in 8/2020 \par \par Plan:\par - c/w Sprycel 100 mg daily\par -CBC, CMP and iron studies were done as well\par -RTC in 6 months\par \par \par

## 2021-03-01 NOTE — HISTORY OF PRESENT ILLNESS
[Therapy: ___] : Therapy: [unfilled] [FreeTextEntry1] : Sprycel 100 mg daily started on 8/14/19 [de-identified] : 8/19/19\par he is here for follow up. His BCR ABL was positive for p210 and a small clone of p190 and he was stared on Sprycel 100 mg daily. he was aslo stared on hydrea  500 mg BID. HE is doing well and his WBC are coming down. he had a bone marrow results are pending. he was seen in [facility] [place] and they agreed with current treatment. \par \par 9/4/19\par He is here for follow-up of CML.  He has been tolerating Sprycel daily with Hydrea 500mg BiD.  He offers no new complaints.  His appetite has been improving.    His CBC showed a normal WBC, his Hgb went down to to 10 and platelets are now 76,000. His bone marrow biopsy confimred chronic phase CML with repeat BCR/ABL PCR showed p210 and P190\par \par 10/21/19\par He is here for follow up. Reports no Side effects. His CBC is showing mild anemia and mild decrease WBC. He has no complaints. Not SOB.  No edema. \par \par \par 1/22/2020\par He is here for follow up his BCR ALB was  negative now for p190 and p210 was 1.041 in 11/2019. Down from  >10%  from August. CBC from today  just has mild anemia . He feels well and has no complaitns. No new meds.\par \par 2/24/21\par He is here for saida perdomo. He is doing well. No complaints. No new meds. [de-identified] : CC: My WBC is high\par \par He is here at the request of Dr. Oumou Washington MD  \par \par A 64 with no PMH was referred to us by his PMD for leukocytosis . Pt reports that he was in his usual state of health up until a 3 wks ago when he started having back pain with hemtauria , that prompted him to go to his PMD , by that time he was asymptomatic .He was referred for some routine w/u , CBC done on 7/31  showed Hb of 12.4 , with WBC count of 141.9 , diff of 70% neutrophils with rare blast cells, lymphocytes of 4% , platelets were 267 , rest of the blood work was fine . Pt was then sent for repeat CBC on 8/2, which again showed WBC count of 135 with Hb of 12 and normal platelet count , differential again demonstrated 45% neutrophils , 8% lymphocytes and 1 blast . Pt was then referred to us for further evaluation . Pt reports that he has no new symptoms, is very active , denies weight loss, loss of appetite , any recent infections, or any other symptoms .

## 2021-08-25 ENCOUNTER — APPOINTMENT (OUTPATIENT)
Dept: HEMATOLOGY ONCOLOGY | Facility: CLINIC | Age: 66
End: 2021-08-25
Payer: COMMERCIAL

## 2021-08-25 ENCOUNTER — LABORATORY RESULT (OUTPATIENT)
Age: 66
End: 2021-08-25

## 2021-08-25 ENCOUNTER — OUTPATIENT (OUTPATIENT)
Dept: OUTPATIENT SERVICES | Facility: HOSPITAL | Age: 66
LOS: 1 days | Discharge: HOME | End: 2021-08-25

## 2021-08-25 VITALS
HEART RATE: 87 BPM | TEMPERATURE: 97.1 F | WEIGHT: 164 LBS | RESPIRATION RATE: 16 BRPM | BODY MASS INDEX: 24.86 KG/M2 | HEIGHT: 68 IN | DIASTOLIC BLOOD PRESSURE: 84 MMHG | SYSTOLIC BLOOD PRESSURE: 169 MMHG

## 2021-08-25 DIAGNOSIS — N40.0 BENIGN PROSTATIC HYPERPLASIA WITHOUT LOWER URINARY TRACT SYMPMS: ICD-10-CM

## 2021-08-25 DIAGNOSIS — Z98.890 OTHER SPECIFIED POSTPROCEDURAL STATES: Chronic | ICD-10-CM

## 2021-08-25 LAB
HCT VFR BLD CALC: 42.2 %
HGB BLD-MCNC: 14.1 G/DL
MCHC RBC-ENTMCNC: 29.4 PG
MCHC RBC-ENTMCNC: 33.4 G/DL
MCV RBC AUTO: 87.9 FL
PLATELET # BLD AUTO: 265 K/UL
PMV BLD: 9.9 FL
RBC # BLD: 4.8 M/UL
RBC # FLD: 13.1 %
WBC # FLD AUTO: 9.38 K/UL

## 2021-08-25 PROCEDURE — 99213 OFFICE O/P EST LOW 20 MIN: CPT

## 2021-08-29 NOTE — PHYSICAL EXAM
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

## 2021-08-29 NOTE — HISTORY OF PRESENT ILLNESS
[Therapy: ___] : Therapy: [unfilled] [FreeTextEntry1] : Sprycel 100 mg daily started on 8/14/19 [de-identified] : 8/19/19\par he is here for follow up. His BCR ABL was positive for p210 and a small clone of p190 and he was stared on Sprycel 100 mg daily. he was aslo stared on hydrea  500 mg BID. HE is doing well and his WBC are coming down. he had a bone marrow results are pending. he was seen in [facility] and they agreed with current treatment. \par \par 9/4/19\par He is here for follow-up of CML.  He has been tolerating Sprycel daily with Hydrea 500mg BiD.  He offers no new complaints.  His appetite has been improving.    His CBC showed a normal WBC, his Hgb went down to to 10 and platelets are now 76,000. His bone marrow biopsy confimred chronic phase CML with repeat BCR/ABL PCR showed p210 and P190\par \par 10/21/19\par He is here for follow up. Reports no Side effects. His CBC is showing mild anemia and mild decrease WBC. He has no complaints. Not SOB.  No edema. \par \par \par 1/22/2020\par He is here for follow up his BCR ALB was  negative now for p190 and p210 was 1.041 in 11/2019. Down from  >10%  from August. CBC from today  just has mild anemia . He feels well and has no complaitns. No new meds.\par \par 2/24/21\par He is here for saida perdomo. He is doing well. No complaints. No new meds.\par \par 8/25/21\par Patient is here for a follow-up visit for CML.  He is feeling well with no new complaints.  Reviewed most recent CBC, which is stable and WNL.  Patient denies fever, chills, nausea, vomiting, dyspnea, early satiety, night sweats or palpable lumps/bumps.   He has been tolerating Sprycel daily.  Last BCR-ABL from 02/2021 was 0.003% on the International Scale.   Since last visit, he reports that he experienced hematuria and was diagnosed with "bladder stones" and BPH.   They performed ureteral transection of prostate and scraping and removal of bladder stones down in [place] (he is living there now).  Then he developed UTI with pseudonomas and was given 2 weeks of IV abx.  He is about 1 month post op now.   [de-identified] : CC: My WBC is high\par \par He is here at the request of Dr. Oumou Washington MD  \par \par A 64 with no PMH was referred to us by his PMD for leukocytosis . Pt reports that he was in his usual state of health up until a 3 wks ago when he started having back pain with hemtauria , that prompted him to go to his PMD , by that time he was asymptomatic .He was referred for some routine w/u , CBC done on 7/31  showed Hb of 12.4 , with WBC count of 141.9 , diff of 70% neutrophils with rare blast cells, lymphocytes of 4% , platelets were 267 , rest of the blood work was fine . Pt was then sent for repeat CBC on 8/2, which again showed WBC count of 135 with Hb of 12 and normal platelet count , differential again demonstrated 45% neutrophils , 8% lymphocytes and 1 blast . Pt was then referred to us for further evaluation . Pt reports that he has no new symptoms, is very active , denies weight loss, loss of appetite , any recent infections, or any other symptoms .

## 2021-08-29 NOTE — REVIEW OF SYSTEMS
[Negative] : Allergic/Immunologic [Fever] : no fever [Chills] : no chills [Chest Pain] : no chest pain [Abdominal Pain] : no abdominal pain [Easy Bleeding] : no tendency for easy bleeding [FreeTextEntry8] : hematuria resolved

## 2021-08-29 NOTE — END OF VISIT
[FreeTextEntry3] : I was physically present for the key portions of the evaluation and management service provided.  I agree with the history and physical, and plan which I have reviewed and edited where appropriate.\par \par Doing well. CBC is normal. Will check blood work with BCR ABL if at goal he will RTC in 6 months

## 2021-08-29 NOTE — ASSESSMENT
[FreeTextEntry1] : 66 with no PMH was referred to us by his PMD for leukocytosis  Found to have chronic phase   CML with p210 and small p190 clone. p190 clone has resolved and BCR ABL has been stable. Came back 0.003 from todays visit and  was 0.006 in 8/2020 \par \par Plan:\par - c/w Sprycel 100 mg daily, rx renewed\par - CBC, CMP, BCR-ABL, iron studies, ferritin today\par \par RTC in 6 months with CBC, CMP, BCR-ABL

## 2021-08-30 DIAGNOSIS — C92.10 CHRONIC MYELOID LEUKEMIA, BCR/ABL-POSITIVE, NOT HAVING ACHIEVED REMISSION: ICD-10-CM

## 2021-08-30 LAB
FERRITIN SERPL-MCNC: 19 NG/ML
T(9;22)(ABL1,BCR)/CONTROL BLD/T: NORMAL

## 2021-12-23 ENCOUNTER — RX RENEWAL (OUTPATIENT)
Age: 66
End: 2021-12-23

## 2022-01-20 ENCOUNTER — RX RENEWAL (OUTPATIENT)
Age: 67
End: 2022-01-20

## 2022-02-25 ENCOUNTER — LABORATORY RESULT (OUTPATIENT)
Age: 67
End: 2022-02-25

## 2022-02-25 ENCOUNTER — APPOINTMENT (OUTPATIENT)
Dept: HEMATOLOGY ONCOLOGY | Facility: CLINIC | Age: 67
End: 2022-02-25
Payer: COMMERCIAL

## 2022-02-25 ENCOUNTER — OUTPATIENT (OUTPATIENT)
Dept: OUTPATIENT SERVICES | Facility: HOSPITAL | Age: 67
LOS: 1 days | Discharge: HOME | End: 2022-02-25

## 2022-02-25 VITALS
BODY MASS INDEX: 25.01 KG/M2 | HEART RATE: 94 BPM | WEIGHT: 165 LBS | HEIGHT: 68 IN | TEMPERATURE: 98.2 F | SYSTOLIC BLOOD PRESSURE: 177 MMHG | DIASTOLIC BLOOD PRESSURE: 85 MMHG

## 2022-02-25 DIAGNOSIS — Z98.890 OTHER SPECIFIED POSTPROCEDURAL STATES: Chronic | ICD-10-CM

## 2022-02-25 LAB
HCT VFR BLD CALC: 47.1 %
HGB BLD-MCNC: 16.4 G/DL
MCHC RBC-ENTMCNC: 31.2 PG
MCHC RBC-ENTMCNC: 34.8 G/DL
MCV RBC AUTO: 89.7 FL
PLATELET # BLD AUTO: 266 K/UL
PMV BLD: 9.7 FL
RBC # BLD: 5.25 M/UL
RBC # FLD: 13.4 %
WBC # FLD AUTO: 6.89 K/UL

## 2022-02-25 PROCEDURE — 99214 OFFICE O/P EST MOD 30 MIN: CPT

## 2022-02-25 NOTE — HISTORY OF PRESENT ILLNESS
[Therapy: ___] : Therapy: [unfilled] [FreeTextEntry1] : Sprycel 100 mg daily started on 8/14/19 [de-identified] : 8/19/19\par he is here for follow up. His BCR ABL was positive for p210 and a small clone of p190 and he was stared on Sprycel 100 mg daily. he was aslo stared on hydrea  500 mg BID. HE is doing well and his WBC are coming down. he had a bone marrow results are pending. he was seen in [facility] and they agreed with current treatment. \par \par 9/4/19\par He is here for follow-up of CML.  He has been tolerating Sprycel daily with Hydrea 500mg BiD.  He offers no new complaints.  His appetite has been improving.    His CBC showed a normal WBC, his Hgb went down to to 10 and platelets are now 76,000. His bone marrow biopsy confimred chronic phase CML with repeat BCR/ABL PCR showed p210 and P190\par \par 10/21/19\par He is here for follow up. Reports no Side effects. His CBC is showing mild anemia and mild decrease WBC. He has no complaints. Not SOB.  No edema. \par \par \par 1/22/2020\par He is here for follow up his BCR ALB was  negative now for p190 and p210 was 1.041 in 11/2019. Down from  >10%  from August. CBC from today  just has mild anemia . He feels well and has no complaitns. No new meds.\par \par 2/24/21\par He is here for saida perdomo. He is doing well. No complaints. No new meds.\par \par 8/25/21\par Patient is here for a follow-up visit for CML.  He is feeling well with no new complaints.  Reviewed most recent CBC, which is stable and WNL.  Patient denies fever, chills, nausea, vomiting, dyspnea, early satiety, night sweats or palpable lumps/bumps.   He has been tolerating Sprycel daily.  Last BCR-ABL from 02/2021 was 0.003% on the International Scale.   Since last visit, he reports that he experienced hematuria and was diagnosed with "bladder stones" and BPH.   They performed ureteral transection of prostate and scraping and removal of bladder stones down in [place] (he is living there now).  Then he developed UTI with pseudonomas and was given 2 weeks of IV abx.  He is about 1 month post op now.  \par \par 2/25/22\par He is here for follow up. He feels well. No complications from Sprycel. His last BCR ABL was  undetectable. CBC today is normal [de-identified] : CC: My WBC is high\par \par He is here at the request of Dr. Oumou Washington MD  \par \par A 64 with no PMH was referred to us by his PMD for leukocytosis . Pt reports that he was in his usual state of health up until a 3 wks ago when he started having back pain with hemtauria , that prompted him to go to his PMD , by that time he was asymptomatic .He was referred for some routine w/u , CBC done on 7/31  showed Hb of 12.4 , with WBC count of 141.9 , diff of 70% neutrophils with rare blast cells, lymphocytes of 4% , platelets were 267 , rest of the blood work was fine . Pt was then sent for repeat CBC on 8/2, which again showed WBC count of 135 with Hb of 12 and normal platelet count , differential again demonstrated 45% neutrophils , 8% lymphocytes and 1 blast . Pt was then referred to us for further evaluation . Pt reports that he has no new symptoms, is very active , denies weight loss, loss of appetite , any recent infections, or any other symptoms .

## 2022-02-25 NOTE — ASSESSMENT
[FreeTextEntry1] : 66 with no PMH was referred to us by his PMD for leukocytosis  Found to have chronic phase   CML with p210 and small p190 clone. p190 clone has resolved and BCR ABL was undetacable with last visit in 8/2021\par - c/w Sprycel 100 mg daily,\par - CMP, BCR-ABL, sent will call with results and if at goal RTC in 6 months with CBC, CMP, BCR-ABL

## 2022-02-25 NOTE — PHYSICAL EXAM
[Fully active, able to carry on all pre-disease performance without restriction] : Status 0 - Fully active, able to carry on all pre-disease performance without restriction [Normal] : grossly intact [de-identified] : Multiple cherry hemangiomas on trunk and back

## 2022-02-25 NOTE — REVIEW OF SYSTEMS
[Fever] : no fever [Chills] : no chills [Chest Pain] : no chest pain [Abdominal Pain] : no abdominal pain [Easy Bleeding] : no tendency for easy bleeding [Negative] : Allergic/Immunologic

## 2022-02-27 LAB
ALBUMIN SERPL ELPH-MCNC: 4.8 G/DL
ALP BLD-CCNC: 81 U/L
ALT SERPL-CCNC: 21 U/L
ANION GAP SERPL CALC-SCNC: 15 MMOL/L
AST SERPL-CCNC: 17 U/L
BILIRUB SERPL-MCNC: 0.3 MG/DL
BUN SERPL-MCNC: 14 MG/DL
CALCIUM SERPL-MCNC: 9.5 MG/DL
CHLORIDE SERPL-SCNC: 101 MMOL/L
CO2 SERPL-SCNC: 25 MMOL/L
CREAT SERPL-MCNC: 0.8 MG/DL
GLUCOSE SERPL-MCNC: 101 MG/DL
POTASSIUM SERPL-SCNC: 3.7 MMOL/L
PROT SERPL-MCNC: 8.3 G/DL
SODIUM SERPL-SCNC: 141 MMOL/L

## 2022-02-28 DIAGNOSIS — Z51.81 ENCOUNTER FOR THERAPEUTIC DRUG LEVEL MONITORING: ICD-10-CM

## 2022-02-28 DIAGNOSIS — C92.10 CHRONIC MYELOID LEUKEMIA, BCR/ABL-POSITIVE, NOT HAVING ACHIEVED REMISSION: ICD-10-CM

## 2022-03-07 LAB — T(9;22)(ABL1,BCR)/CONTROL BLD/T: NORMAL

## 2022-06-07 ENCOUNTER — RX RENEWAL (OUTPATIENT)
Age: 67
End: 2022-06-07

## 2022-08-22 ENCOUNTER — OUTPATIENT (OUTPATIENT)
Dept: OUTPATIENT SERVICES | Facility: HOSPITAL | Age: 67
LOS: 1 days | Discharge: HOME | End: 2022-08-22

## 2022-08-22 ENCOUNTER — APPOINTMENT (OUTPATIENT)
Dept: HEMATOLOGY ONCOLOGY | Facility: CLINIC | Age: 67
End: 2022-08-22

## 2022-08-22 ENCOUNTER — LABORATORY RESULT (OUTPATIENT)
Age: 67
End: 2022-08-22

## 2022-08-22 DIAGNOSIS — Z98.890 OTHER SPECIFIED POSTPROCEDURAL STATES: Chronic | ICD-10-CM

## 2022-08-22 LAB
ALBUMIN SERPL ELPH-MCNC: 4.7 G/DL
ALP BLD-CCNC: 72 U/L
ALT SERPL-CCNC: 14 U/L
ANION GAP SERPL CALC-SCNC: 12 MMOL/L
AST SERPL-CCNC: 17 U/L
BILIRUB SERPL-MCNC: 0.5 MG/DL
BUN SERPL-MCNC: 21 MG/DL
CALCIUM SERPL-MCNC: 9.4 MG/DL
CHLORIDE SERPL-SCNC: 105 MMOL/L
CO2 SERPL-SCNC: 28 MMOL/L
CREAT SERPL-MCNC: 0.9 MG/DL
EGFR: 95 ML/MIN/1.73M2
GLUCOSE SERPL-MCNC: 104 MG/DL
HCT VFR BLD CALC: 43.8 %
HGB BLD-MCNC: 15.5 G/DL
IRON SATN MFR SERPL: 43 %
IRON SERPL-MCNC: 135 UG/DL
MCHC RBC-ENTMCNC: 31.3 PG
MCHC RBC-ENTMCNC: 35.4 G/DL
MCV RBC AUTO: 88.5 FL
PLATELET # BLD AUTO: 230 K/UL
PMV BLD: 9.9 FL
POTASSIUM SERPL-SCNC: 4.3 MMOL/L
PROT SERPL-MCNC: 7.8 G/DL
RBC # BLD: 4.95 M/UL
RBC # FLD: 13.5 %
SODIUM SERPL-SCNC: 145 MMOL/L
TIBC SERPL-MCNC: 315 UG/DL
UIBC SERPL-MCNC: 180 UG/DL
WBC # FLD AUTO: 7.41 K/UL

## 2022-08-22 PROCEDURE — 99214 OFFICE O/P EST MOD 30 MIN: CPT

## 2022-08-22 NOTE — HISTORY OF PRESENT ILLNESS
[Therapy: ___] : Therapy: [unfilled] [FreeTextEntry1] : Sprycel 100 mg daily started on 8/14/19 [de-identified] : 8/19/19\par he is here for follow up. His BCR ABL was positive for p210 and a small clone of p190 and he was stared on Sprycel 100 mg daily. he was aslo stared on hydrea  500 mg BID. HE is doing well and his WBC are coming down. he had a bone marrow results are pending. he was seen in [facility] and they agreed with current treatment. \par \par 9/4/19\par He is here for follow-up of CML.  He has been tolerating Sprycel daily with Hydrea 500mg BiD.  He offers no new complaints.  His appetite has been improving.    His CBC showed a normal WBC, his Hgb went down to to 10 and platelets are now 76,000. His bone marrow biopsy confimred chronic phase CML with repeat BCR/ABL PCR showed p210 and P190\par \par 10/21/19\par He is here for follow up. Reports no Side effects. His CBC is showing mild anemia and mild decrease WBC. He has no complaints. Not SOB.  No edema. \par \par \par 1/22/2020\par He is here for follow up his BCR ALB was  negative now for p190 and p210 was 1.041 in 11/2019. Down from  >10%  from August. CBC from today  just has mild anemia . He feels well and has no complaitns. No new meds.\par \par 2/24/21\par He is here for saida perdomo. He is doing well. No complaints. No new meds.\par \par 8/25/21\par Patient is here for a follow-up visit for CML.  He is feeling well with no new complaints.  Reviewed most recent CBC, which is stable and WNL.  Patient denies fever, chills, nausea, vomiting, dyspnea, early satiety, night sweats or palpable lumps/bumps.   He has been tolerating Sprycel daily.  Last BCR-ABL from 02/2021 was 0.003% on the International Scale.   Since last visit, he reports that he experienced hematuria and was diagnosed with "bladder stones" and BPH.   They performed ureteral transection of prostate and scraping and removal of bladder stones down in [place] (he is living there now).  Then he developed UTI with pseudonomas and was given 2 weeks of IV abx.  He is about 1 month post op now.  \par \par 2/25/22\par He is here for follow up. He feels well. No complications from Sprycel. His last BCR ABL was  undetectable. CBC today is normal\par \par 8/22/22\par He is here for follow up. His  BCR ABL from 2/2022 was undetectable. He feels well. Denies any bleeding or side effects.  [de-identified] : CC: My WBC is high\par \par He is here at the request of Dr. Oumou Washington MD  \par \par A 64 with no PMH was referred to us by his PMD for leukocytosis . Pt reports that he was in his usual state of health up until a 3 wks ago when he started having back pain with hemtauria , that prompted him to go to his PMD , by that time he was asymptomatic .He was referred for some routine w/u , CBC done on 7/31  showed Hb of 12.4 , with WBC count of 141.9 , diff of 70% neutrophils with rare blast cells, lymphocytes of 4% , platelets were 267 , rest of the blood work was fine . Pt was then sent for repeat CBC on 8/2, which again showed WBC count of 135 with Hb of 12 and normal platelet count , differential again demonstrated 45% neutrophils , 8% lymphocytes and 1 blast . Pt was then referred to us for further evaluation . Pt reports that he has no new symptoms, is very active , denies weight loss, loss of appetite , any recent infections, or any other symptoms .

## 2022-08-22 NOTE — ASSESSMENT
[FreeTextEntry1] : 67 with no PMH was referred to us by his PMD for leukocytosis  Found to have chronic phase   CML with p210 and small p190 clone. p190 clone has resolved and BCR ABL was undetectable  with last visit in 2/2022\par - c/w Sprycel 100 mg daily,\par - CMP, BCR-ABL, sent will call with results and if at goal RTC in 6 months with CBC, CMP, BCR-ABL\par \par #Iron deficiency anemia due to hematuria from kidney stones for month and also had surgical blood loss with intervention  TURBT  as well for BPH in abouut July 2021 in [place]\par -he has been on oral iron since 8/2021\par -recommended a screening colonsocopy and he will consider or at least a  cologuard with PCP \par -will check irons studies today if normal will stop oral iron \par \par \par RTC in 6 months will call with results \par

## 2022-08-22 NOTE — REVIEW OF SYSTEMS
[Negative] : Allergic/Immunologic [Fever] : no fever [Chills] : no chills [Chest Pain] : no chest pain [Abdominal Pain] : no abdominal pain [Easy Bleeding] : no tendency for easy bleeding

## 2022-08-22 NOTE — PHYSICAL EXAM
[Fully active, able to carry on all pre-disease performance without restriction] : Status 0 - Fully active, able to carry on all pre-disease performance without restriction [Normal] : grossly intact [de-identified] : Multiple cherry hemangiomas on trunk and back

## 2022-08-23 LAB — FERRITIN SERPL-MCNC: 77 NG/ML

## 2022-08-24 DIAGNOSIS — Z51.81 ENCOUNTER FOR THERAPEUTIC DRUG LEVEL MONITORING: ICD-10-CM

## 2022-08-24 DIAGNOSIS — C92.10 CHRONIC MYELOID LEUKEMIA, BCR/ABL-POSITIVE, NOT HAVING ACHIEVED REMISSION: ICD-10-CM

## 2022-08-24 DIAGNOSIS — D50.0 IRON DEFICIENCY ANEMIA SECONDARY TO BLOOD LOSS (CHRONIC): ICD-10-CM

## 2022-08-26 LAB — T(9;22)(ABL1,BCR)/CONTROL BLD/T: NORMAL

## 2022-09-11 NOTE — PACU DISCHARGE NOTE - NSPTMEETSDISCHCRITERIADT_GEN_A_CORE
Patient arrived to [facility] from [facility] by [facility]     Report received from: OSDONOVAN RN, Yris    Type of stroke/diagnosis: ALS, facial abscess      Current symptoms: Paraplegic, vented     Skin assessment done: Y  Wounds noted: L frontal foot scabbed, R big toe nail bed, L heel wound, back/sucrum/buttocks/posterior thigh wounds, Sacral stage 2    *If wounds noted, was Wound Care consulted? Y    Feng Completed? N - PEG    Patient Belongings on Admit: blanket and translating computer    [facility] notified: ASIA Vyas    
RT was called by nurse due to patient vent alarming. Nurse stated she let the bed down and was turning the patient, when she heard the vent alarming and she could hear air coming from the patient trach. I went in to assist the patient and check the patient cuff pressure and added more air to the cuff, also I adjusted the patient trach tie.   
08-Jan-2020 11:26

## 2022-09-12 NOTE — PROCEDURE
[facility] pharmacy called about anastrozole and she is wondering if she should renew it or not.  Also, she is wondering about her last lab results.     [Bone Marrow Biopsy] : bone marrow biopsy [Bone Marrow Aspiration] : bone marrow aspiration  [Patient] : the patient [Patient identification verified] : patient identification verified [Procedure verified and consent obtained] : procedure verified and consent obtained [Correct positioning] : correct positioning [The left posterior iliac crest was prepped with betadine and draped, using sterile technique.] : The left posterior iliac crest was prepped with betadine and draped, using sterile technique. [Lidocaine was injected and into the periosteum overlying the site.] : Lidocaine was injected and into the periosteum overlying the site. [Cytogenetics] : cytogenetics [Biopsy] : biopsy [] : The patient was instructed to remove the bandage the following AM. The patient may bathe. Acetaminophen may be taken for discomfort, as per package directions.If there are any other problems, the patient was instructed to call the office. The patient verbalized understanding, and is aware of the office contact numbers. [Flow Cytometry] : flow cytometry [FreeTextEntry2] : He tolerated the procedure well. There was less than 5 cc blood loss. No complications.  [FreeTextEntry1] : CML

## 2022-11-04 ENCOUNTER — RX RENEWAL (OUTPATIENT)
Age: 67
End: 2022-11-04

## 2022-11-04 RX ORDER — FERROUS GLUCONATE 324(38)MG
324 (38 FE) TABLET ORAL
Qty: 15 | Refills: 5 | Status: ACTIVE | COMMUNITY
Start: 2022-01-20 | End: 1900-01-01

## 2023-02-13 ENCOUNTER — OUTPATIENT (OUTPATIENT)
Dept: OUTPATIENT SERVICES | Facility: HOSPITAL | Age: 68
LOS: 1 days | End: 2023-02-13
Payer: COMMERCIAL

## 2023-02-13 ENCOUNTER — APPOINTMENT (OUTPATIENT)
Dept: HEMATOLOGY ONCOLOGY | Facility: CLINIC | Age: 68
End: 2023-02-13
Payer: COMMERCIAL

## 2023-02-13 ENCOUNTER — APPOINTMENT (OUTPATIENT)
Dept: HEMATOLOGY ONCOLOGY | Facility: CLINIC | Age: 68
End: 2023-02-13

## 2023-02-13 ENCOUNTER — LABORATORY RESULT (OUTPATIENT)
Age: 68
End: 2023-02-13

## 2023-02-13 VITALS
RESPIRATION RATE: 16 BRPM | DIASTOLIC BLOOD PRESSURE: 85 MMHG | BODY MASS INDEX: 25.61 KG/M2 | TEMPERATURE: 97.1 F | HEIGHT: 68 IN | SYSTOLIC BLOOD PRESSURE: 180 MMHG | WEIGHT: 169 LBS | HEART RATE: 79 BPM

## 2023-02-13 DIAGNOSIS — Z98.890 OTHER SPECIFIED POSTPROCEDURAL STATES: Chronic | ICD-10-CM

## 2023-02-13 DIAGNOSIS — D72.829 ELEVATED WHITE BLOOD CELL COUNT, UNSPECIFIED: ICD-10-CM

## 2023-02-13 LAB
HCT VFR BLD CALC: 45.4 %
HGB BLD-MCNC: 15.2 G/DL
MCHC RBC-ENTMCNC: 30.2 PG
MCHC RBC-ENTMCNC: 33.5 G/DL
MCV RBC AUTO: 90.3 FL
PLATELET # BLD AUTO: 258 K/UL
PMV BLD: 10.2 FL
RBC # BLD: 5.03 M/UL
RBC # FLD: 13.8 %
WBC # FLD AUTO: 7.97 K/UL

## 2023-02-13 PROCEDURE — 85027 COMPLETE CBC AUTOMATED: CPT

## 2023-02-13 PROCEDURE — 99214 OFFICE O/P EST MOD 30 MIN: CPT

## 2023-02-13 PROCEDURE — 81207 BCR/ABL1 GENE MINOR BP: CPT

## 2023-02-13 PROCEDURE — 81206 BCR/ABL1 GENE MAJOR BP: CPT

## 2023-02-13 PROCEDURE — 80053 COMPREHEN METABOLIC PANEL: CPT

## 2023-02-14 DIAGNOSIS — D72.829 ELEVATED WHITE BLOOD CELL COUNT, UNSPECIFIED: ICD-10-CM

## 2023-02-14 LAB
ALBUMIN SERPL ELPH-MCNC: 4.5 G/DL
ALP BLD-CCNC: 76 U/L
ALT SERPL-CCNC: 14 U/L
ANION GAP SERPL CALC-SCNC: 13 MMOL/L
AST SERPL-CCNC: 15 U/L
BILIRUB SERPL-MCNC: 0.4 MG/DL
BUN SERPL-MCNC: 22 MG/DL
CALCIUM SERPL-MCNC: 9.8 MG/DL
CHLORIDE SERPL-SCNC: 105 MMOL/L
CO2 SERPL-SCNC: 27 MMOL/L
CREAT SERPL-MCNC: 1 MG/DL
EGFR: 84 ML/MIN/1.73M2
GLUCOSE SERPL-MCNC: 108 MG/DL
POTASSIUM SERPL-SCNC: 4.4 MMOL/L
PROT SERPL-MCNC: 7.6 G/DL
SODIUM SERPL-SCNC: 145 MMOL/L

## 2023-02-15 NOTE — PHYSICAL EXAM
[Fully active, able to carry on all pre-disease performance without restriction] : Status 0 - Fully active, able to carry on all pre-disease performance without restriction [Normal] : grossly intact [de-identified] : Multiple cherry hemangiomas on trunk and back

## 2023-02-15 NOTE — HISTORY OF PRESENT ILLNESS
[de-identified] : CC: My WBC is high\par \par He is here at the request of Dr. Oumou Washington MD  \par \par A 64 with no PMH was referred to us by his PMD for leukocytosis. Pt reports that he was in his usual state of health up until a 3 wks ago when he started having back pain with hematuria, that prompted him to go to his PMD, by that time he was asymptomatic. He was referred for some routine w/u, CBC done on 7/31 showed Hb of 12.4, with WBC count of 141.9, diff of 70% neutrophils with rare blast cells, lymphocytes of 4%, platelets were 267, rest of the blood work was fine. Pt was then sent for repeat CBC on 8/2, which again showed WBC count of 135 with Hb of 12 and normal platelet count, differential again demonstrated 45% neutrophils, 8% lymphocytes and 1 blast. Pt was then referred to us for further evaluation. Pt reports that he has no new symptoms, is very active, denies weight loss, loss of appetite, any recent infections, or any other symptoms. [de-identified] : 8/19/19\par he is here for follow up. His BCR ABL was positive for p210 and a small clone of p190 and he was stared on Sprycel 100 mg daily. he was aslo stared on hydrea 500 mg BID. HE is doing well and his WBC are coming down. he had a bone marrow results are pending. he was seen in [facility] and they agreed with current treatment. \par \par 9/4/19\par He is here for follow-up of CML. He has been tolerating Sprycel daily with Hydrea 500mg BiD. He offers no new complaints. His appetite has been improving. His CBC showed a normal WBC, his Hgb went down to to 10 and platelets are now 76,000. His bone marrow biopsy confirmed chronic phase CML with repeat BCR/ABL PCR showed p210 and P190.\par \par 10/21/19\par He is here for follow up. Reports no Side effects. His CBC is showing mild anemia and mild decrease WBC. He has no complaints. Not SOB.  No edema. \par \par 1/22/2020\par He is here for follow up his BCR ALB was  negative now for p190 and p210 was 1.041 in 11/2019. Down from  >10%  from August. CBC from today  just has mild anemia . He feels well and has no complaints. No new meds.\par \par 2/24/21\par He is here for saida perdomo. He is doing well. No complaints. No new meds.\par \par 8/25/21\par Patient is here for a follow-up visit for CML.  He is feeling well with no new complaints.  Reviewed most recent CBC, which is stable and WNL.  Patient denies fever, chills, nausea, vomiting, dyspnea, early satiety, night sweats or palpable lumps/bumps.   He has been tolerating Sprycel daily.  Last BCR-ABL from 02/2021 was 0.003% on the International Scale.   Since last visit, he reports that he experienced hematuria and was diagnosed with "bladder stones" and BPH.   They performed ureteral transection of prostate and scraping and removal of bladder stones down in [place] (he is living there now).  Then he developed UTI with pseudonomas and was given 2 weeks of IV abx. He is about 1 month post op now.  \par \par 2/25/22\par He is here for follow up. He feels well. No complications from Sprycel. His last BCR ABL was  undetectable. CBC today is normal\par \par 8/22/22\par He is here for follow up. His  BCR ABL from 2/2022 was undetectable. He feels well. Denies any bleeding or side effects. \par \par 02/13/2023 \par Reports feeling well, no changes in chronic conditions or new complaints since the last visit. Planing to have a few dental extractions. BCR ABl from 8/2022 Not detected\par

## 2023-02-15 NOTE — ASSESSMENT
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 \par - p190 clone has resolved and BCR ABL was undetectable in 02/2022.\par - was on Hydrea in the past.\par - 08/14/2019 started Sprycel 100 mg PO QD.\par \par # Iron deficiency anemia due to hematuria from kidney stones for month and also had surgical blood loss with intervention TURBT as well for BPH in about 07/2021 in [place].\par - he has been on oral iron since 08/2021.\par - recommended a screening colonoscopy and he will consider or at least a colo-guard with PCP.\par - last CBC was normal \par \par 02/13/2023 remains clinically stable to continue current management.\par \par PLAN:\par \par - continue Sprycel 100 mg PO QD. HOLD Sprycel 2 days prior the dental extraction and resume the next day if no bleeding.\par \par - Labs today: CBC, CMP, BCR-ABL.\par \par RTC in 6 months with CBC, CMP, BCR-ABL. If BCR/ABL is stable

## 2023-02-15 NOTE — END OF VISIT
[FreeTextEntry3] : I was physically present for the key portions of the evaluation and management service provided.  I agree with the history and physical, and plan which I have reviewed and edited where appropriate.\par \par Returns for follow-up today he is doing well.  We will continue with Sprycel we did blood work today including CBC CMP and BCR able we will call him with results and proceed from there

## 2023-02-16 LAB — T(9;22)(ABL1,BCR)/CONTROL BLD/T: NORMAL

## 2023-05-04 NOTE — H&P PST ADULT - PRO PAIN EXPRESSION
May 5, 2023       [facility]  [address] [address] [address] [place] [address]      Patient: Isela Mata   YOB: 1999   Date of Visit: 5/4/2023       Dear Dr. Won Carter:    I saw your patient, Luis Fernando Hollis, for an evaluation. Below are my notes for this visit with her. If you have questions, please do not hesitate to call me. Sincerely,        Aldean Nyhan, MD        CC: No Recipients  Aldean Nyhan, MD  5/5/2023  7:01 AM  Signed  VIDEO VISIT  HISTORY OF PRESENT ILLNESS:  Isela Mata is a 26year old female whom returns 4+ months s/pÂ LEFT Knee Arthroscopic Anterior Cruciate LigamentÂ ReconstructionÂ with Hamstring AutograftÂ (DOS:Â 12/21/2022). Patient advised to continue formal PT 2 days per week and refrain from sports/running/jumping. Notes she has not done any PT in 3+ weeks due to insurance coverage issues without having an appt with us. Patient denies any pain or instability. Feeling really good actually. Bruising to the medial side of the tibial incision since surgery is still there. Has not been icing or taking any meds. Notes she would like to have more PT if possible. Patient notes she ran a few times and it felt good. She graduated with a Business degree and will be living by school out of state. Virtual Visit  Â   Patient is at [employer] and joins [address] [place] [address] 5/4/2023 in the car with brother driving for her consented video encounter. 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 PAIN INTERMITENT. â¢ Depressive disorder    â¢ Eczema    â¢ Sore throat     PER PATIENT ,WAS SICK ONE WEEK AGO AND HAS CURRENT C/O VERY SLIGHT SORE THROAT.         Past Surgical History:   Procedure Laterality Date   â¢ Knee surgery Left 12/21/2022    LEFT Knee Arthroscopic Anterior Cruciate Ligament Reconstruction   â¢ [place] tooth extraction          Family History   Problem Relation Age of Onset   â¢ Diabetes Mother    â¢ Heart disease Father         POSSIBLE   â¢ Heart disease Paternal Grandmother    â¢ Stroke/TIA Paternal Grandmother    â¢ Diabetes Paternal Grandmother         Social History     Tobacco Use   Smoking Status Never   Smokeless Tobacco Never   Vaping Use   Vaping Status Former      Social History     Substance and Sexual Activity   Alcohol Use Yes    Comment: social      Social History     Substance and Sexual Activity   Drug Use Yes   â¢ Types: Marijuana    Comment: OCCASIONAL USE. USES DELTA 8. MEDICATIONS:  Current Outpatient Medications   Medication Sig Dispense Refill   â¢ sertraline (ZOLOFT) 100 MG tablet Take 2 tablets by mouth daily. â¢ albuterol 108 (90 Base) MCG/ACT inhaler Inhale 2 puffs into the lungs every 4 hours as needed for Shortness of Breath or Wheezing. â¢ Sertraline HCl 200 MG Cap Take 200 mg by mouth every afternoon. No current facility-administered medications for this visit. ALLERGIES:   Allergen Reactions   â¢ Clarithromycin NAUSEA     bioxin    OCCURRED DURING CHILDHOOD       PHYSICAL EXAMINATION:  Alert and oriented, no acute distress  Nonlabored respirations    LEFT Knee: Well healed incisions but some keloid formation at all incisions. Ecchymosis remains around the tibial incision. No Effusion. ROM appropriate without pain when patient performs AROM from car. IMAGING & TESTING:   None Today. ASSESSMENT & PLAN:  Adri Avila is a 26year old female s/pÂ LEFT Knee Arthroscopic Anterior Cruciate LigamentÂ ReconstructionÂ with Hamstring AutograftÂ (DOS:Â 12/21/2022). .     Plan as follows:  1. Doing well, but has not had PT for 3+ weeks due to insurance issues. 2. Recommend icing to the tibial incision bruising especially to see if makes a difference for her.   3. She definitely needs more PT as this is a 6+ month post op recovery to increase her strength and prevent re-tear and instability. 4. Follow up on 5/25/2023 for recheck in office as she will be in town for a bachelorette party then. Mathieu Huddleston PA-C  05/04/23    DUE TO THE CORONAVIRUS THIS WAS A VIDEO CONSULT SPENT 20 MINUTES TOTAL ON ZOOM WITH THE PATIENT (INCLUDING CHARTING).      Jyoti Lim MD  05/04/23 verbalization

## 2023-08-14 ENCOUNTER — LABORATORY RESULT (OUTPATIENT)
Age: 68
End: 2023-08-14

## 2023-08-14 ENCOUNTER — OUTPATIENT (OUTPATIENT)
Dept: OUTPATIENT SERVICES | Facility: HOSPITAL | Age: 68
LOS: 1 days | End: 2023-08-14
Payer: COMMERCIAL

## 2023-08-14 ENCOUNTER — APPOINTMENT (OUTPATIENT)
Dept: HEMATOLOGY ONCOLOGY | Facility: CLINIC | Age: 68
End: 2023-08-14
Payer: COMMERCIAL

## 2023-08-14 DIAGNOSIS — Z51.81 ENCOUNTER FOR THERAPEUTIC DRUG LVL MONITORING: ICD-10-CM

## 2023-08-14 DIAGNOSIS — Z98.890 OTHER SPECIFIED POSTPROCEDURAL STATES: Chronic | ICD-10-CM

## 2023-08-14 DIAGNOSIS — L23.7 ALLERGIC CONTACT DERMATITIS DUE TO PLANTS, EXCEPT FOOD: ICD-10-CM

## 2023-08-14 DIAGNOSIS — C93.10 CHRONIC MYELOMONOCYTIC LEUKEMIA NOT HAVING ACHIEVED REMISSION: ICD-10-CM

## 2023-08-14 LAB
ALBUMIN SERPL ELPH-MCNC: 4.4 G/DL
ALP BLD-CCNC: 73 U/L
ALT SERPL-CCNC: 16 U/L
ANION GAP SERPL CALC-SCNC: 13 MMOL/L
AST SERPL-CCNC: 16 U/L
BILIRUB SERPL-MCNC: 0.5 MG/DL
BUN SERPL-MCNC: 20 MG/DL
CALCIUM SERPL-MCNC: 9 MG/DL
CHLORIDE SERPL-SCNC: 105 MMOL/L
CO2 SERPL-SCNC: 25 MMOL/L
CREAT SERPL-MCNC: 0.8 MG/DL
EGFR: 98 ML/MIN/1.73M2
GLUCOSE SERPL-MCNC: 128 MG/DL
HCT VFR BLD CALC: 43.7 %
HGB BLD-MCNC: 15 G/DL
MCHC RBC-ENTMCNC: 30.3 PG
MCHC RBC-ENTMCNC: 34.3 G/DL
MCV RBC AUTO: 88.3 FL
PLATELET # BLD AUTO: 241 K/UL
PMV BLD: 10.1 FL
POTASSIUM SERPL-SCNC: 4 MMOL/L
PROT SERPL-MCNC: 7.2 G/DL
RBC # BLD: 4.95 M/UL
RBC # FLD: 14.3 %
SODIUM SERPL-SCNC: 143 MMOL/L
WBC # FLD AUTO: 6.11 K/UL

## 2023-08-14 PROCEDURE — 99214 OFFICE O/P EST MOD 30 MIN: CPT

## 2023-08-14 PROCEDURE — G0452: CPT | Mod: 26

## 2023-08-14 PROCEDURE — 85027 COMPLETE CBC AUTOMATED: CPT

## 2023-08-14 PROCEDURE — 80053 COMPREHEN METABOLIC PANEL: CPT

## 2023-08-14 PROCEDURE — 81207 BCR/ABL1 GENE MINOR BP: CPT

## 2023-08-14 PROCEDURE — 81206 BCR/ABL1 GENE MAJOR BP: CPT

## 2023-08-15 DIAGNOSIS — C93.10 CHRONIC MYELOMONOCYTIC LEUKEMIA NOT HAVING ACHIEVED REMISSION: ICD-10-CM

## 2023-08-20 NOTE — END OF VISIT
[] : Fellow [FreeTextEntry3] : Patient returns for follow-up for CML we will continue with continue Sprycel 100 mg daily.  Has no major issues.  We will see us back in 6 months CBC CMP BCR.ABL were done, Hydrocortisone ceam for poison ivy

## 2023-08-20 NOTE — HISTORY OF PRESENT ILLNESS
[de-identified] : CC: My WBC is high\par  \par  He is here at the request of Dr. Oumou Washington MD  \par  \par  A 64 with no PMH was referred to us by his PMD for leukocytosis. Pt reports that he was in his usual state of health up until a 3 wks ago when he started having back pain with hematuria, that prompted him to go to his PMD, by that time he was asymptomatic. He was referred for some routine w/u, CBC done on 7/31 showed Hb of 12.4, with WBC count of 141.9, diff of 70% neutrophils with rare blast cells, lymphocytes of 4%, platelets were 267, rest of the blood work was fine. Pt was then sent for repeat CBC on 8/2, which again showed WBC count of 135 with Hb of 12 and normal platelet count, differential again demonstrated 45% neutrophils, 8% lymphocytes and 1 blast. Pt was then referred to us for further evaluation. Pt reports that he has no new symptoms, is very active, denies weight loss, loss of appetite, any recent infections, or any other symptoms. [de-identified] : 8/19/19 he is here for follow up. His BCR ABL was positive for p210 and a small clone of p190 and he was stared on Sprycel 100 mg daily. he was aslo stared on hydrea 500 mg BID. HE is doing well and his WBC are coming down. he had a bone marrow results are pending. he was [place] [facility] [place] agreed with current treatment.   9/4/19 He is here for follow-up of CML. He has been tolerating Sprycel daily with Hydrea 500mg BiD. He offers no new complaints. His appetite has been improving. His CBC showed a normal WBC, his Hgb went down to to 10 and platelets are now 76,000. His bone marrow biopsy confirmed chronic phase CML with repeat BCR/ABL PCR showed p210 and P190.  10/21/19 He is here for follow up. Reports no Side effects. His CBC is showing mild anemia and mild decrease WBC. He has no complaints. Not SOB.  No edema.   1/22/2020 He is here for follow up his BCR ALB was  negative now for p190 and p210 was 1.041 in 11/2019. Down from  >10%  from August. CBC from today  just has mild anemia . He feels well and has no complaints. No new meds.  2/24/21 He is here for saida perdomo. He is doing well. No complaints. No new meds.  8/25/21 Patient is here for a follow-up visit for CML.  He is feeling well with no new complaints.  Reviewed most recent CBC, which is stable and WNL.  Patient denies fever, chills, nausea, vomiting, dyspnea, early satiety, night sweats or palpable lumps/bumps.   He has been tolerating Sprycel daily.  Last BCR-ABL from 02/2021 was 0.003% on the International Scale.   Since last visit, he reports that he experienced hematuria and was diagnosed with "bladder stones" and BPH.   They performed ureteral transection of prostate and scraping and removal of bladder stones down in [place] (he is living there now).  Then he developed UTI with pseudonomas and was given 2 weeks of IV abx. He is about 1 month post op now.    2/25/22 He is here for follow up. He feels well. No complications from Sprycel. His last BCR ABL was  undetectable. CBC today is normal  8/22/22 He is here for follow up. His  BCR ABL from 2/2022 was undetectable. He feels well. Denies any bleeding or side effects.   02/13/2023  Reports feeling well, no changes in chronic conditions or new complaints since the last visit. Planing to have a few dental extractions. BCR ABl from 8/2022 Not detected  08/14/2023  Pt is here for his CML on Spycel. He is feeling well. He was doing garden work and contacted poison ivy and developed a rash on the forearm. Otherwise, no changes in chronic conditions or new complaints since the last visit. BCR ABl from 2/2023 was 0.001%.

## 2023-08-20 NOTE — ASSESSMENT
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  - p190 clone has resolved and BCR ABL was undetectable in 02/2022. - was on Hydrea in the past. - 08/14/2019 started Sprycel 100 mg PO QD.  # Iron deficiency anemia due to hematuria from kidney stones for month and also had surgical blood loss with intervention TURBT as well for BPH in about 07/2021 in [place], resolved - he has been on oral iron since 08/2021. - recommended a screening colonoscopy and he will consider or at least a colo-guard with PCP. - last CBC was normal   # Poison ivy dermatitis.   02/13/2023 remains clinically stable to continue current management.  PLAN: - continue Sprycel 100 mg PO QD, refill provided to pt - start clobetasol propionate 0.05% cream twice daily until improvement for his poison ivy dermatitis - Labs today: CBC, CMP, BCR-ABL.  RTC in 6 months with CBC, CMP, BCR-ABL. If BCR/ABL is stable

## 2023-08-20 NOTE — PHYSICAL EXAM
[Fully active, able to carry on all pre-disease performance without restriction] : Status 0 - Fully active, able to carry on all pre-disease performance without restriction [Normal] : grossly intact [de-identified] : Multiple cherry hemangiomas on trunk and back. R forearm rash.

## 2023-08-20 NOTE — REVIEW OF SYSTEMS
[Negative] : Allergic/Immunologic [Skin Rash] : skin rash [de-identified] : Poison ivy dermatitis of R forearm.

## 2023-08-21 LAB — T(9;22)(ABL1,BCR)/CONTROL BLD/T: NORMAL

## 2023-09-27 NOTE — H&P PST ADULT - HEIGHT IN INCHES
How Severe Are Your Spot(S)?: mild What Type Of Note Output Would You Prefer (Optional)?: Standard Output What Is The Reason For Today's Visit?: Full Body Skin Examination What Is The Reason For Today's Visit? (Being Monitored For X): concerning skin lesions on an annual basis 8

## 2023-10-16 ENCOUNTER — RX RENEWAL (OUTPATIENT)
Age: 68
End: 2023-10-16

## 2024-02-09 ENCOUNTER — LABORATORY RESULT (OUTPATIENT)
Age: 69
End: 2024-02-09

## 2024-02-09 ENCOUNTER — APPOINTMENT (OUTPATIENT)
Dept: HEMATOLOGY ONCOLOGY | Facility: CLINIC | Age: 69
End: 2024-02-09
Payer: COMMERCIAL

## 2024-02-09 ENCOUNTER — OUTPATIENT (OUTPATIENT)
Dept: OUTPATIENT SERVICES | Facility: HOSPITAL | Age: 69
LOS: 1 days | End: 2024-02-09
Payer: COMMERCIAL

## 2024-02-09 DIAGNOSIS — D50.0 IRON DEFICIENCY ANEMIA SECONDARY TO BLOOD LOSS (CHRONIC): ICD-10-CM

## 2024-02-09 DIAGNOSIS — Z51.12 ENCOUNTER FOR ANTINEOPLASTIC CHEMOTHERAPY: ICD-10-CM

## 2024-02-09 DIAGNOSIS — C92.10 CHRONIC MYELOID LEUKEMIA, BCR/ABL-POSITIVE, NOT HAVING ACHIEVED REMISSION: ICD-10-CM

## 2024-02-09 DIAGNOSIS — Z51.11 ENCOUNTER FOR ANTINEOPLASTIC CHEMOTHERAPY: ICD-10-CM

## 2024-02-09 DIAGNOSIS — Z98.890 OTHER SPECIFIED POSTPROCEDURAL STATES: Chronic | ICD-10-CM

## 2024-02-09 LAB
ALBUMIN SERPL ELPH-MCNC: 4.4 G/DL
ALBUMIN SERPL ELPH-MCNC: 4.4 G/DL
ALP BLD-CCNC: 75 U/L
ALP BLD-CCNC: 90 U/L
ALT SERPL-CCNC: 14 U/L
ALT SERPL-CCNC: 16 U/L
ANION GAP SERPL CALC-SCNC: 12 MMOL/L
ANION GAP SERPL CALC-SCNC: 13 MMOL/L
AST SERPL-CCNC: 16 U/L
AST SERPL-CCNC: 18 U/L
BILIRUB SERPL-MCNC: 0.3 MG/DL
BILIRUB SERPL-MCNC: 0.5 MG/DL
BUN SERPL-MCNC: 17 MG/DL
BUN SERPL-MCNC: 23 MG/DL
CALCIUM SERPL-MCNC: 9.2 MG/DL
CALCIUM SERPL-MCNC: 9.5 MG/DL
CHLORIDE SERPL-SCNC: 102 MMOL/L
CHLORIDE SERPL-SCNC: 106 MMOL/L
CO2 SERPL-SCNC: 25 MMOL/L
CO2 SERPL-SCNC: 26 MMOL/L
CREAT SERPL-MCNC: 0.8 MG/DL
CREAT SERPL-MCNC: 1 MG/DL
EGFR: 98 ML/MIN/1.73M2
GLUCOSE SERPL-MCNC: 96 MG/DL
GLUCOSE SERPL-MCNC: 99 MG/DL
HCT VFR BLD CALC: 44 %
HGB BLD-MCNC: 15.6 G/DL
IRON SATN MFR SERPL: 15 %
IRON SERPL-MCNC: 53 UG/DL
MCHC RBC-ENTMCNC: 31.6 PG
MCHC RBC-ENTMCNC: 35.5 G/DL
MCV RBC AUTO: 89.2 FL
PLATELET # BLD AUTO: 234 K/UL
PMV BLD: 9.7 FL
POTASSIUM SERPL-SCNC: 4.4 MMOL/L
POTASSIUM SERPL-SCNC: 4.8 MMOL/L
PROT SERPL-MCNC: 7.5 G/DL
PROT SERPL-MCNC: 7.7 G/DL
RBC # BLD: 4.93 M/UL
RBC # FLD: 13.5 %
SODIUM SERPL-SCNC: 141 MMOL/L
SODIUM SERPL-SCNC: 143 MMOL/L
TIBC SERPL-MCNC: 357 UG/DL
UIBC SERPL-MCNC: 304 UG/DL
WBC # FLD AUTO: 8.3 K/UL

## 2024-02-09 PROCEDURE — 99214 OFFICE O/P EST MOD 30 MIN: CPT

## 2024-02-09 PROCEDURE — 85027 COMPLETE CBC AUTOMATED: CPT

## 2024-02-09 PROCEDURE — 80053 COMPREHEN METABOLIC PANEL: CPT

## 2024-02-09 PROCEDURE — 81207 BCR/ABL1 GENE MINOR BP: CPT

## 2024-02-09 PROCEDURE — G0452: CPT | Mod: 26

## 2024-02-09 PROCEDURE — 81206 BCR/ABL1 GENE MAJOR BP: CPT

## 2024-02-09 RX ORDER — CLOBETASOL PROPIONATE 0.5 MG/G
0.05 CREAM TOPICAL TWICE DAILY
Qty: 50 | Refills: 0 | Status: DISCONTINUED | COMMUNITY
Start: 2023-08-14 | End: 2024-02-09

## 2024-02-09 RX ORDER — FERROUS GLUCONATE 324(37.5)
324 (37.5 FE) TABLET ORAL
Qty: 30 | Refills: 2 | Status: DISCONTINUED | COMMUNITY
Start: 2021-08-30 | End: 2024-02-09

## 2024-02-10 DIAGNOSIS — C92.10 CHRONIC MYELOID LEUKEMIA, BCR/ABL-POSITIVE, NOT HAVING ACHIEVED REMISSION: ICD-10-CM

## 2024-02-13 ENCOUNTER — RX RENEWAL (OUTPATIENT)
Age: 69
End: 2024-02-13

## 2024-02-14 NOTE — END OF VISIT
[FreeTextEntry3] : I was physically present for the key portions of the evaluation and management service provided.  I agree with the history and physical, and plan which I have reviewed and edited where appropriate. He is here for follow-up for MAGDI and CML he is doing well on Sprycel CBC was fine today BCR-ABL has been undetectable.  We did blood work today he could see us back in 6 months we will give him a call with results

## 2024-02-14 NOTE — ASSESSMENT
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  - p190 clone has resolved and BCR ABL was undetectable in 02/2022. - was on Hydrea in the past. - 08/14/2019 started Sprycel 100 mg PO QD.  # Iron deficiency anemia - resolved. - due to hematuria from kidney stones for month and also had surgical blood loss with intervention TURBT as well for BPH in about 07/2021 in [place]. - he has been on oral iron since 08/2021. - recommended a screening colonoscopy and he will consider or at least a colo-guard with PCP. - on Fe PO QOD.  # Poison ivy dermatitis - resolved.  02/09/2024 Labs reviewed and results discussed with the patient - remains clinically stable to continue current management. All questions were answered to satisfaction.  PLAN: - continue Sprycel 100 mg PO QD. - continue Fe PO QOD. - Labs today: CBC, CMP, BCR-ABL. RTC in 6 months with CBC, CMP, BCR-ABL.

## 2024-02-14 NOTE — HISTORY OF PRESENT ILLNESS
[de-identified] : CC: My WBC is high\par  \par  He is here at the request of Dr. Oumou Washington MD  \par  \par  A 64 with no PMH was referred to us by his PMD for leukocytosis. Pt reports that he was in his usual state of health up until a 3 wks ago when he started having back pain with hematuria, that prompted him to go to his PMD, by that time he was asymptomatic. He was referred for some routine w/u, CBC done on 7/31 showed Hb of 12.4, with WBC count of 141.9, diff of 70% neutrophils with rare blast cells, lymphocytes of 4%, platelets were 267, rest of the blood work was fine. Pt was then sent for repeat CBC on 8/2, which again showed WBC count of 135 with Hb of 12 and normal platelet count, differential again demonstrated 45% neutrophils, 8% lymphocytes and 1 blast. Pt was then referred to us for further evaluation. Pt reports that he has no new symptoms, is very active, denies weight loss, loss of appetite, any recent infections, or any other symptoms. [de-identified] : 8/19/19 he is here for follow up. His BCR ABL was positive for p210 and a small clone of p190 and he was stared on Sprycel 100 mg daily. he was aslo stared on hydrea 500 mg BID. HE is doing well and his WBC are coming down. he had a bone marrow results are pending. [place] [facility] [place].   9/4/19 He is here for follow-up of CML. He has been tolerating Sprycel daily with Hydrea 500mg BiD. He offers no new complaints. His appetite has been improving. His CBC showed a normal WBC, his Hgb went down to to 10 and platelets are now 76,000. His bone marrow biopsy confirmed chronic phase CML with repeat BCR/ABL PCR showed p210 and P190.  10/21/19 He is here for follow up. Reports no Side effects. His CBC is showing mild anemia and mild decrease WBC. He has no complaints. Not SOB.  No edema.   1/22/2020 He is here for follow up his BCR ALB was  negative now for p190 and p210 was 1.041 in 11/2019. Down from  >10%  from August. CBC from today  just has mild anemia . He feels well and has no complaints. No new meds.  2/24/21 He is here for saida perdomo. He is doing well. No complaints. No new meds.  8/25/21 Patient is here for a follow-up visit for CML.  He is feeling well with no new complaints.  Reviewed most recent CBC, which is stable and WNL.  Patient denies fever, chills, nausea, vomiting, dyspnea, early satiety, night sweats or palpable lumps/bumps.   He has been tolerating Sprycel daily.  Last BCR-ABL from 02/2021 was 0.003% on the International Scale.   Since last visit, he reports that he experienced hematuria and was diagnosed with "bladder stones" and BPH.   They performed ureteral transection of prostate and scraping and removal of bladder stones down in [place] (he is living there now).  Then he developed UTI with pseudonomas and was given 2 weeks of IV abx. He is about 1 month post op now.    2/25/22 He is here for follow up. He feels well. No complications from Sprycel. His last BCR ABL was  undetectable. CBC today is normal  8/22/22 He is here for follow up. His  BCR ABL from 2/2022 was undetectable. He feels well. Denies any bleeding or side effects.   02/13/2023  Reports feeling well, no changes in chronic conditions or new complaints since the last visit. Planing to have a few dental extractions. BCR ABl from 8/2022 Not detected  08/14/2023  Pt is here for his CML on Spycel. He is feeling well. He was doing garden work and contacted poison ivy and developed a rash on the forearm. Otherwise, no changes in chronic conditions or new complaints since the last visit. BCR ABl from 2/2023 was 0.001%.  02/09/2024 accompanied by his wife; Reports feeling well, no changes in chronic conditions or new complaints since the last visit.

## 2024-02-16 LAB — T(9;22)(ABL1,BCR)/CONTROL BLD/T: NORMAL

## 2024-04-09 RX ORDER — DASATINIB 100 MG/1
100 TABLET ORAL
Qty: 90 | Refills: 1 | Status: ACTIVE | COMMUNITY
Start: 2019-08-09 | End: 1900-01-01

## 2024-08-16 ENCOUNTER — APPOINTMENT (OUTPATIENT)
Age: 69
End: 2024-08-16

## 2024-08-22 ENCOUNTER — OUTPATIENT (OUTPATIENT)
Dept: OUTPATIENT SERVICES | Facility: HOSPITAL | Age: 69
LOS: 1 days | End: 2024-08-22
Payer: COMMERCIAL

## 2024-08-22 ENCOUNTER — LABORATORY RESULT (OUTPATIENT)
Age: 69
End: 2024-08-22

## 2024-08-22 ENCOUNTER — APPOINTMENT (OUTPATIENT)
Age: 69
End: 2024-08-22

## 2024-08-22 DIAGNOSIS — C92.10 CHRONIC MYELOID LEUKEMIA, BCR/ABL-POSITIVE, NOT HAVING ACHIEVED REMISSION: ICD-10-CM

## 2024-08-22 DIAGNOSIS — Z98.890 OTHER SPECIFIED POSTPROCEDURAL STATES: Chronic | ICD-10-CM

## 2024-08-22 LAB
ALBUMIN SERPL ELPH-MCNC: 4.5 G/DL
ALP BLD-CCNC: 86 U/L
ALT SERPL-CCNC: 23 U/L
ANION GAP SERPL CALC-SCNC: 10 MMOL/L
AST SERPL-CCNC: 23 U/L
BILIRUB SERPL-MCNC: 0.6 MG/DL
BUN SERPL-MCNC: 22 MG/DL
CALCIUM SERPL-MCNC: 9.3 MG/DL
CHLORIDE SERPL-SCNC: 106 MMOL/L
CO2 SERPL-SCNC: 28 MMOL/L
CREAT SERPL-MCNC: 0.9 MG/DL
EGFR: 94 ML/MIN/1.73M2
GLUCOSE SERPL-MCNC: 102 MG/DL
HCT VFR BLD CALC: 43.6 %
HGB BLD-MCNC: 14.7 G/DL
MCHC RBC-ENTMCNC: 30.9 PG
MCHC RBC-ENTMCNC: 33.7 G/DL
MCV RBC AUTO: 91.6 FL
PLATELET # BLD AUTO: 212 K/UL
PMV BLD: 9.4 FL
POTASSIUM SERPL-SCNC: 4.5 MMOL/L
PROT SERPL-MCNC: 7.3 G/DL
RBC # BLD: 4.76 M/UL
RBC # FLD: 13.9 %
SODIUM SERPL-SCNC: 144 MMOL/L
WBC # FLD AUTO: 9.35 K/UL

## 2024-08-22 PROCEDURE — 81206 BCR/ABL1 GENE MAJOR BP: CPT

## 2024-08-22 PROCEDURE — 99214 OFFICE O/P EST MOD 30 MIN: CPT

## 2024-08-22 PROCEDURE — G2211 COMPLEX E/M VISIT ADD ON: CPT

## 2024-08-22 PROCEDURE — 81207 BCR/ABL1 GENE MINOR BP: CPT

## 2024-08-22 PROCEDURE — 80053 COMPREHEN METABOLIC PANEL: CPT

## 2024-08-22 PROCEDURE — 85027 COMPLETE CBC AUTOMATED: CPT

## 2024-08-22 RX ORDER — AMLODIPINE BESYLATE 5 MG/1
5 TABLET ORAL
Refills: 0 | Status: ACTIVE | COMMUNITY

## 2024-08-22 RX ORDER — ATORVASTATIN CALCIUM 10 MG/1
10 TABLET, FILM COATED ORAL
Refills: 0 | Status: ACTIVE | COMMUNITY

## 2024-08-22 NOTE — ASSESSMENT
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  - p190 clone has resolved and BCR ABL was undetectable in 02/2022. - was on Hydrea in the past. - 08/14/2019 started Sprycel 100 mg PO QD.  # Iron deficiency anemia - resolved. - due to hematuria from kidney stones for month and also had surgical blood loss with intervention TURBT as well for BPH in about 07/2021 in [place]. - he has been on oral iron since 08/2021. - recommended a screening colonoscopy and he will consider or at least a colo-guard with PCP. - on Fe PO QOD.  # Poison ivy dermatitis - resolved.   PLAN: - continue Sprycel 100 mg PO QD. - continue Fe PO QOD. - Labs today: CBC, CMP, BCR-ABL. RTC in 6 months with CBC, CMP, BCR-ABL.

## 2024-08-22 NOTE — HISTORY OF PRESENT ILLNESS
[de-identified] : 8/19/19 he is here for follow up. His BCR ABL was positive for p210 and a small clone of p190 and he was stared on Sprycel 100 mg daily. he was aslo stared on hydrea 500 mg BID. HE is doing well and his WBC are coming down. he had a bone marrow results are pending. [place] [facility] [place] agreed with current treatment.   9/4/19 He is here for follow-up of CML. He has been tolerating Sprycel daily with Hydrea 500mg BiD. He offers no new complaints. His appetite has been improving. His CBC showed a normal WBC, his Hgb went down to to 10 and platelets are now 76,000. His bone marrow biopsy confirmed chronic phase CML with repeat BCR/ABL PCR showed p210 and P190.  10/21/19 He is here for follow up. Reports no Side effects. His CBC is showing mild anemia and mild decrease WBC. He has no complaints. Not SOB.  No edema.   1/22/2020 He is here for follow up his BCR ALB was  negative now for p190 and p210 was 1.041 in 11/2019. Down from  >10%  from August. CBC from today  just has mild anemia . He feels well and has no complaints. No new meds.  2/24/21 He is here for saida perdomo. He is doing well. No complaints. No new meds.  8/25/21 Patient is here for a follow-up visit for CML.  He is feeling well with no new complaints.  Reviewed most recent CBC, which is stable and WNL.  Patient denies fever, chills, nausea, vomiting, dyspnea, early satiety, night sweats or palpable lumps/bumps.   He has been tolerating Sprycel daily.  Last BCR-ABL from 02/2021 was 0.003% on the International Scale.   Since last visit, he reports that he experienced hematuria and was diagnosed with "bladder stones" and BPH.   They performed ureteral transection of prostate and scraping and removal of bladder stones down in [place] (he is living there now).  Then he developed UTI with pseudonomas and was given 2 weeks of IV abx. He is about 1 month post op now.    2/25/22 He is here for follow up. He feels well. No complications from Sprycel. His last BCR ABL was  undetectable. CBC today is normal  8/22/22 He is here for follow up. His  BCR ABL from 2/2022 was undetectable. He feels well. Denies any bleeding or side effects.   02/13/2023  Reports feeling well, no changes in chronic conditions or new complaints since the last visit. Planing to have a few dental extractions. BCR ABl from 8/2022 Not detected  08/14/2023  Pt is here for his CML on Spycel. He is feeling well. He was doing garden work and contacted poison ivy and developed a rash on the forearm. Otherwise, no changes in chronic conditions or new complaints since the last visit. BCR ABl from 2/2023 was 0.001%.  02/09/2024 accompanied by his wife; Reports feeling well, no changes in chronic conditions or new complaints since the last visit.  August 22, 2024 He is here for follow-up CBC is normal today last BCR-ABL from February is undetectable [de-identified] : CC: My WBC is high\par  \par  He is here at the request of Dr. Oumou Washington MD  \par  \par  A 64 with no PMH was referred to us by his PMD for leukocytosis. Pt reports that he was in his usual state of health up until a 3 wks ago when he started having back pain with hematuria, that prompted him to go to his PMD, by that time he was asymptomatic. He was referred for some routine w/u, CBC done on 7/31 showed Hb of 12.4, with WBC count of 141.9, diff of 70% neutrophils with rare blast cells, lymphocytes of 4%, platelets were 267, rest of the blood work was fine. Pt was then sent for repeat CBC on 8/2, which again showed WBC count of 135 with Hb of 12 and normal platelet count, differential again demonstrated 45% neutrophils, 8% lymphocytes and 1 blast. Pt was then referred to us for further evaluation. Pt reports that he has no new symptoms, is very active, denies weight loss, loss of appetite, any recent infections, or any other symptoms.

## 2024-08-23 DIAGNOSIS — C92.10 CHRONIC MYELOID LEUKEMIA, BCR/ABL-POSITIVE, NOT HAVING ACHIEVED REMISSION: ICD-10-CM

## 2024-08-26 LAB — T(9;22)(ABL1,BCR)/CONTROL BLD/T: NORMAL

## 2025-02-04 ENCOUNTER — OUTPATIENT (OUTPATIENT)
Dept: OUTPATIENT SERVICES | Facility: HOSPITAL | Age: 70
LOS: 1 days | End: 2025-02-04
Payer: COMMERCIAL

## 2025-02-04 ENCOUNTER — APPOINTMENT (OUTPATIENT)
Age: 70
End: 2025-02-04
Payer: COMMERCIAL

## 2025-02-04 ENCOUNTER — LABORATORY RESULT (OUTPATIENT)
Age: 70
End: 2025-02-04

## 2025-02-04 VITALS
DIASTOLIC BLOOD PRESSURE: 95 MMHG | SYSTOLIC BLOOD PRESSURE: 150 MMHG | BODY MASS INDEX: 22.88 KG/M2 | WEIGHT: 151 LBS | TEMPERATURE: 97.9 F | HEIGHT: 68 IN | OXYGEN SATURATION: 93 % | HEART RATE: 87 BPM | RESPIRATION RATE: 16 BRPM

## 2025-02-04 DIAGNOSIS — Z98.890 OTHER SPECIFIED POSTPROCEDURAL STATES: Chronic | ICD-10-CM

## 2025-02-04 DIAGNOSIS — N40.0 BENIGN PROSTATIC HYPERPLASIA WITHOUT LOWER URINARY TRACT SYMPTOMS: ICD-10-CM

## 2025-02-04 DIAGNOSIS — C92.10 CHRONIC MYELOID LEUKEMIA, BCR/ABL-POSITIVE, NOT HAVING ACHIEVED REMISSION: ICD-10-CM

## 2025-02-04 DIAGNOSIS — Z51.81 ENCOUNTER FOR THERAPEUTIC DRUG LVL MONITORING: ICD-10-CM

## 2025-02-04 LAB
ALBUMIN SERPL ELPH-MCNC: 4.4 G/DL
ALP BLD-CCNC: 80 U/L
ALT SERPL-CCNC: 22 U/L
ANION GAP SERPL CALC-SCNC: 11 MMOL/L
AST SERPL-CCNC: 20 U/L
BILIRUB SERPL-MCNC: 0.4 MG/DL
BUN SERPL-MCNC: 24 MG/DL
CALCIUM SERPL-MCNC: 8.9 MG/DL
CHLORIDE SERPL-SCNC: 106 MMOL/L
CO2 SERPL-SCNC: 25 MMOL/L
CREAT SERPL-MCNC: 0.8 MG/DL
EGFR: 97 ML/MIN/1.73M2
GLUCOSE SERPL-MCNC: 121 MG/DL
HCT VFR BLD CALC: 43.1 %
HGB BLD-MCNC: 14.6 G/DL
IRON SATN MFR SERPL: 30 %
IRON SERPL-MCNC: 87 UG/DL
MCHC RBC-ENTMCNC: 30.9 PG
MCHC RBC-ENTMCNC: 33.9 G/DL
MCV RBC AUTO: 91.3 FL
PLATELET # BLD AUTO: 210 K/UL
PMV BLD: 9.8 FL
POTASSIUM SERPL-SCNC: 4.5 MMOL/L
PROT SERPL-MCNC: 7.1 G/DL
RBC # BLD: 4.72 M/UL
RBC # FLD: 14.1 %
SODIUM SERPL-SCNC: 142 MMOL/L
TIBC SERPL-MCNC: 286 UG/DL
UIBC SERPL-MCNC: 199 UG/DL
WBC # FLD AUTO: 6.33 K/UL

## 2025-02-04 PROCEDURE — 83550 IRON BINDING TEST: CPT

## 2025-02-04 PROCEDURE — 83540 ASSAY OF IRON: CPT

## 2025-02-04 PROCEDURE — 81207 BCR/ABL1 GENE MINOR BP: CPT

## 2025-02-04 PROCEDURE — 80053 COMPREHEN METABOLIC PANEL: CPT

## 2025-02-04 PROCEDURE — 99214 OFFICE O/P EST MOD 30 MIN: CPT

## 2025-02-04 PROCEDURE — 85027 COMPLETE CBC AUTOMATED: CPT

## 2025-02-04 PROCEDURE — G0452: CPT | Mod: 26

## 2025-02-04 PROCEDURE — G2211 COMPLEX E/M VISIT ADD ON: CPT

## 2025-02-04 PROCEDURE — 81206 BCR/ABL1 GENE MAJOR BP: CPT

## 2025-02-05 DIAGNOSIS — N40.0 BENIGN PROSTATIC HYPERPLASIA WITHOUT LOWER URINARY TRACT SYMPTOMS: ICD-10-CM

## 2025-02-07 LAB — T(9;22)(ABL1,BCR)/CONTROL BLD/T: NORMAL

## 2025-09-02 ENCOUNTER — APPOINTMENT (OUTPATIENT)
Age: 70
End: 2025-09-02
Payer: COMMERCIAL

## 2025-09-02 VITALS
OXYGEN SATURATION: 100 % | BODY MASS INDEX: 25.46 KG/M2 | DIASTOLIC BLOOD PRESSURE: 78 MMHG | HEIGHT: 68 IN | WEIGHT: 168 LBS | SYSTOLIC BLOOD PRESSURE: 149 MMHG | HEART RATE: 76 BPM | RESPIRATION RATE: 16 BRPM | TEMPERATURE: 97.7 F

## 2025-09-02 DIAGNOSIS — Z51.12 ENCOUNTER FOR ANTINEOPLASTIC CHEMOTHERAPY: ICD-10-CM

## 2025-09-02 DIAGNOSIS — Z51.11 ENCOUNTER FOR ANTINEOPLASTIC CHEMOTHERAPY: ICD-10-CM

## 2025-09-02 DIAGNOSIS — D50.0 IRON DEFICIENCY ANEMIA SECONDARY TO BLOOD LOSS (CHRONIC): ICD-10-CM

## 2025-09-02 DIAGNOSIS — C92.10 CHRONIC MYELOID LEUKEMIA, BCR/ABL-POSITIVE, NOT HAVING ACHIEVED REMISSION: ICD-10-CM

## 2025-09-02 LAB
ALBUMIN SERPL ELPH-MCNC: 4.6 G/DL
ALP BLD-CCNC: 87 U/L
ALT SERPL-CCNC: 26 U/L
ANION GAP SERPL CALC-SCNC: 13 MMOL/L
AST SERPL-CCNC: 23 U/L
AUTO BASOPHILS #: 0.04 K/UL
AUTO BASOPHILS %: 0.5 %
AUTO EOSINOPHILS #: 0.2 K/UL
AUTO EOSINOPHILS %: 2.5 %
AUTO IMMATURE GRANULOCYTES #: 0.06 K/UL
AUTO LYMPHOCYTES #: 2.56 K/UL
AUTO LYMPHOCYTES %: 32.4 %
AUTO MONOCYTES #: 0.69 K/UL
AUTO MONOCYTES %: 8.7 %
AUTO NEUTROPHILS #: 4.34 K/UL
AUTO NEUTROPHILS %: 55.1 %
AUTO NRBC #: 0 K/UL
BILIRUB SERPL-MCNC: 0.5 MG/DL
BUN SERPL-MCNC: 23 MG/DL
CALCIUM SERPL-MCNC: 9.1 MG/DL
CHLORIDE SERPL-SCNC: 103 MMOL/L
CO2 SERPL-SCNC: 25 MMOL/L
CREAT SERPL-MCNC: 0.9 MG/DL
EGFRCR SERPLBLD CKD-EPI 2021: 93 ML/MIN/1.73M2
GLUCOSE SERPL-MCNC: 92 MG/DL
HCT VFR BLD CALC: 41.4 %
HGB BLD-MCNC: 14.5 G/DL
IMM GRANULOCYTES NFR BLD AUTO: 0.8 %
MAN DIFF?: NORMAL
MCHC RBC-ENTMCNC: 31.7 PG
MCHC RBC-ENTMCNC: 35 G/DL
MCV RBC AUTO: 90.4 FL
PLATELET # BLD AUTO: 230 K/UL
PMV BLD AUTO: 0 /100 WBCS
PMV BLD: 9.6 FL
POTASSIUM SERPL-SCNC: 4.3 MMOL/L
PROT SERPL-MCNC: 7.3 G/DL
RBC # BLD: 4.58 M/UL
RBC # FLD: 13.7 %
SODIUM SERPL-SCNC: 141 MMOL/L
WBC # FLD AUTO: 7.89 K/UL

## 2025-09-02 PROCEDURE — 99214 OFFICE O/P EST MOD 30 MIN: CPT

## 2025-09-02 PROCEDURE — G2211 COMPLEX E/M VISIT ADD ON: CPT

## 2025-09-08 LAB — T(9;22)(ABL1,BCR)/CONTROL BLD/T: NORMAL
